# Patient Record
Sex: MALE | Race: WHITE
[De-identification: names, ages, dates, MRNs, and addresses within clinical notes are randomized per-mention and may not be internally consistent; named-entity substitution may affect disease eponyms.]

---

## 2017-05-26 ENCOUNTER — HOSPITAL ENCOUNTER (INPATIENT)
Dept: HOSPITAL 17 - PHED | Age: 57
LOS: 3 days | Discharge: HOME | DRG: 378 | End: 2017-05-29
Attending: HOSPITALIST | Admitting: HOSPITALIST
Payer: OTHER GOVERNMENT

## 2017-05-26 VITALS
TEMPERATURE: 98.4 F | DIASTOLIC BLOOD PRESSURE: 64 MMHG | HEART RATE: 73 BPM | OXYGEN SATURATION: 99 % | RESPIRATION RATE: 13 BRPM | SYSTOLIC BLOOD PRESSURE: 97 MMHG

## 2017-05-26 VITALS
TEMPERATURE: 98.8 F | DIASTOLIC BLOOD PRESSURE: 62 MMHG | SYSTOLIC BLOOD PRESSURE: 98 MMHG | OXYGEN SATURATION: 100 % | RESPIRATION RATE: 19 BRPM | HEART RATE: 79 BPM

## 2017-05-26 VITALS
TEMPERATURE: 98.2 F | OXYGEN SATURATION: 100 % | HEART RATE: 91 BPM | DIASTOLIC BLOOD PRESSURE: 75 MMHG | RESPIRATION RATE: 20 BRPM | SYSTOLIC BLOOD PRESSURE: 110 MMHG

## 2017-05-26 VITALS
TEMPERATURE: 98.7 F | DIASTOLIC BLOOD PRESSURE: 63 MMHG | SYSTOLIC BLOOD PRESSURE: 116 MMHG | RESPIRATION RATE: 20 BRPM | OXYGEN SATURATION: 98 % | HEART RATE: 109 BPM

## 2017-05-26 VITALS
DIASTOLIC BLOOD PRESSURE: 69 MMHG | SYSTOLIC BLOOD PRESSURE: 102 MMHG | HEART RATE: 82 BPM | RESPIRATION RATE: 8 BRPM | OXYGEN SATURATION: 100 %

## 2017-05-26 VITALS
OXYGEN SATURATION: 99 % | RESPIRATION RATE: 12 BRPM | DIASTOLIC BLOOD PRESSURE: 66 MMHG | HEART RATE: 80 BPM | SYSTOLIC BLOOD PRESSURE: 96 MMHG

## 2017-05-26 VITALS
OXYGEN SATURATION: 98 % | DIASTOLIC BLOOD PRESSURE: 70 MMHG | SYSTOLIC BLOOD PRESSURE: 104 MMHG | TEMPERATURE: 98.7 F | HEART RATE: 70 BPM | RESPIRATION RATE: 11 BRPM

## 2017-05-26 VITALS
RESPIRATION RATE: 11 BRPM | DIASTOLIC BLOOD PRESSURE: 75 MMHG | OXYGEN SATURATION: 100 % | HEART RATE: 90 BPM | SYSTOLIC BLOOD PRESSURE: 112 MMHG

## 2017-05-26 VITALS
RESPIRATION RATE: 10 BRPM | DIASTOLIC BLOOD PRESSURE: 65 MMHG | HEART RATE: 87 BPM | OXYGEN SATURATION: 100 % | SYSTOLIC BLOOD PRESSURE: 95 MMHG | TEMPERATURE: 98.3 F

## 2017-05-26 VITALS
HEART RATE: 86 BPM | RESPIRATION RATE: 12 BRPM | TEMPERATURE: 98.5 F | OXYGEN SATURATION: 99 % | SYSTOLIC BLOOD PRESSURE: 89 MMHG | DIASTOLIC BLOOD PRESSURE: 62 MMHG

## 2017-05-26 VITALS
HEART RATE: 82 BPM | DIASTOLIC BLOOD PRESSURE: 67 MMHG | SYSTOLIC BLOOD PRESSURE: 101 MMHG | RESPIRATION RATE: 11 BRPM | OXYGEN SATURATION: 100 %

## 2017-05-26 VITALS
TEMPERATURE: 98.4 F | SYSTOLIC BLOOD PRESSURE: 91 MMHG | RESPIRATION RATE: 7 BRPM | HEART RATE: 99 BPM | OXYGEN SATURATION: 99 % | DIASTOLIC BLOOD PRESSURE: 60 MMHG

## 2017-05-26 VITALS
DIASTOLIC BLOOD PRESSURE: 63 MMHG | OXYGEN SATURATION: 100 % | RESPIRATION RATE: 20 BRPM | HEART RATE: 102 BPM | SYSTOLIC BLOOD PRESSURE: 116 MMHG

## 2017-05-26 VITALS
RESPIRATION RATE: 9 BRPM | SYSTOLIC BLOOD PRESSURE: 85 MMHG | DIASTOLIC BLOOD PRESSURE: 56 MMHG | OXYGEN SATURATION: 100 % | HEART RATE: 76 BPM

## 2017-05-26 VITALS — OXYGEN SATURATION: 97 % | DIASTOLIC BLOOD PRESSURE: 67 MMHG | SYSTOLIC BLOOD PRESSURE: 91 MMHG | HEART RATE: 96 BPM

## 2017-05-26 VITALS
HEART RATE: 93 BPM | SYSTOLIC BLOOD PRESSURE: 100 MMHG | RESPIRATION RATE: 18 BRPM | OXYGEN SATURATION: 100 % | DIASTOLIC BLOOD PRESSURE: 69 MMHG

## 2017-05-26 VITALS — HEART RATE: 74 BPM | OXYGEN SATURATION: 98 % | RESPIRATION RATE: 13 BRPM

## 2017-05-26 VITALS — OXYGEN SATURATION: 100 %

## 2017-05-26 VITALS — OXYGEN SATURATION: 99 %

## 2017-05-26 DIAGNOSIS — D62: ICD-10-CM

## 2017-05-26 DIAGNOSIS — F17.210: ICD-10-CM

## 2017-05-26 DIAGNOSIS — E86.0: ICD-10-CM

## 2017-05-26 DIAGNOSIS — G89.29: ICD-10-CM

## 2017-05-26 DIAGNOSIS — K92.1: Primary | ICD-10-CM

## 2017-05-26 DIAGNOSIS — E87.0: ICD-10-CM

## 2017-05-26 DIAGNOSIS — K26.9: ICD-10-CM

## 2017-05-26 DIAGNOSIS — E86.1: ICD-10-CM

## 2017-05-26 DIAGNOSIS — T39.395A: ICD-10-CM

## 2017-05-26 LAB
ALP SERPL-CCNC: 57 U/L (ref 45–117)
ALT SERPL-CCNC: 44 U/L (ref 12–78)
ANION GAP SERPL CALC-SCNC: 8 MEQ/L (ref 5–15)
APTT BLD: 24.5 SEC (ref 24.3–30.1)
AST SERPL-CCNC: 32 U/L (ref 15–37)
BACTERIA #/AREA URNS HPF: (no result) /HPF
BASOPHILS # BLD AUTO: 0.1 TH/MM3 (ref 0–0.2)
BASOPHILS NFR BLD: 0.5 % (ref 0–2)
BILIRUB SERPL-MCNC: 0.2 MG/DL (ref 0.2–1)
BUN SERPL-MCNC: 22 MG/DL (ref 7–18)
CHLORIDE SERPL-SCNC: 113 MEQ/L (ref 98–107)
COLOR UR: YELLOW
COMMENT (UR): (no result)
CULTURE IF INDICATED: (no result)
EOSINOPHIL # BLD: 0.2 TH/MM3 (ref 0–0.4)
EOSINOPHIL NFR BLD: 1.4 % (ref 0–4)
ERYTHROCYTE [DISTWIDTH] IN BLOOD BY AUTOMATED COUNT: 15.2 % (ref 11.6–17.2)
GFR SERPLBLD BASED ON 1.73 SQ M-ARVRAT: 70 ML/MIN (ref 89–?)
GLUCOSE UR STRIP-MCNC: (no result) MG/DL
HCO3 BLD-SCNC: 22.1 MEQ/L (ref 21–32)
HCT VFR BLD CALC: 14.6 % (ref 39–51)
HEMO FLAGS: (no result)
HGB UR QL STRIP: (no result)
INR PPP: 1 RATIO
KETONES UR STRIP-MCNC: (no result) MG/DL
LEUKOCYTE ESTERASE UR QL STRIP: (no result) /HPF (ref 0–5)
LYMPHOCYTES # BLD AUTO: 3 TH/MM3 (ref 1–4.8)
LYMPHOCYTES NFR BLD AUTO: 19.5 % (ref 9–44)
MCH RBC QN AUTO: 29.8 PG (ref 27–34)
MCHC RBC AUTO-ENTMCNC: 33.7 % (ref 32–36)
MCV RBC AUTO: 88.4 FL (ref 80–100)
MONOCYTES NFR BLD: 6 % (ref 0–8)
NEUTROPHILS # BLD AUTO: 11 TH/MM3 (ref 1.8–7.7)
NEUTROPHILS NFR BLD AUTO: 72.6 % (ref 16–70)
NITRITE UR QL STRIP: (no result)
PLAT MORPH BLD: NORMAL
PLATELET # BLD: 351 TH/MM3 (ref 150–450)
PLATELET BLD QL SMEAR: NORMAL
POTASSIUM SERPL-SCNC: 3.5 MEQ/L (ref 3.5–5.1)
PROTHROMBIN TIME: 10.8 SEC (ref 9.8–11.6)
RBC # BLD AUTO: 1.65 MIL/MM3 (ref 4.5–5.9)
RBC #/AREA URNS HPF: (no result) /HPF (ref 0–3)
SCAN/DIFF: (no result)
SODIUM SERPL-SCNC: 143 MEQ/L (ref 136–145)
SP GR UR STRIP: 1.02 (ref 1–1.03)
SQUAMOUS #/AREA URNS HPF: (no result) /HPF (ref 0–5)
WBC # BLD AUTO: 15.2 TH/MM3 (ref 4–11)

## 2017-05-26 PROCEDURE — 88312 SPECIAL STAINS GROUP 1: CPT

## 2017-05-26 PROCEDURE — 36430 TRANSFUSION BLD/BLD COMPNT: CPT

## 2017-05-26 PROCEDURE — 96374 THER/PROPH/DIAG INJ IV PUSH: CPT

## 2017-05-26 PROCEDURE — 96376 TX/PRO/DX INJ SAME DRUG ADON: CPT

## 2017-05-26 PROCEDURE — 85027 COMPLETE CBC AUTOMATED: CPT

## 2017-05-26 PROCEDURE — 86920 COMPATIBILITY TEST SPIN: CPT

## 2017-05-26 PROCEDURE — 88305 TISSUE EXAM BY PATHOLOGIST: CPT

## 2017-05-26 PROCEDURE — 85610 PROTHROMBIN TIME: CPT

## 2017-05-26 PROCEDURE — 81001 URINALYSIS AUTO W/SCOPE: CPT

## 2017-05-26 PROCEDURE — 86900 BLOOD TYPING SEROLOGIC ABO: CPT

## 2017-05-26 PROCEDURE — 86901 BLOOD TYPING SEROLOGIC RH(D): CPT

## 2017-05-26 PROCEDURE — 85014 HEMATOCRIT: CPT

## 2017-05-26 PROCEDURE — 80048 BASIC METABOLIC PNL TOTAL CA: CPT

## 2017-05-26 PROCEDURE — 85025 COMPLETE CBC W/AUTO DIFF WBC: CPT

## 2017-05-26 PROCEDURE — 80053 COMPREHEN METABOLIC PANEL: CPT

## 2017-05-26 PROCEDURE — P9016 RBC LEUKOCYTES REDUCED: HCPCS

## 2017-05-26 PROCEDURE — C9113 INJ PANTOPRAZOLE SODIUM, VIA: HCPCS

## 2017-05-26 PROCEDURE — 85730 THROMBOPLASTIN TIME PARTIAL: CPT

## 2017-05-26 PROCEDURE — 30233N1 TRANSFUSION OF NONAUTOLOGOUS RED BLOOD CELLS INTO PERIPHERAL VEIN, PERCUTANEOUS APPROACH: ICD-10-PCS | Performed by: EMERGENCY MEDICINE

## 2017-05-26 PROCEDURE — 87641 MR-STAPH DNA AMP PROBE: CPT

## 2017-05-26 PROCEDURE — 85018 HEMOGLOBIN: CPT

## 2017-05-26 PROCEDURE — 86850 RBC ANTIBODY SCREEN: CPT

## 2017-05-26 PROCEDURE — 83735 ASSAY OF MAGNESIUM: CPT

## 2017-05-26 PROCEDURE — 93005 ELECTROCARDIOGRAM TRACING: CPT

## 2017-05-26 PROCEDURE — 71010: CPT

## 2017-05-26 RX ADMIN — PANTOPRAZOLE SODIUM SCH MLS/HR: 40 INJECTION, POWDER, FOR SOLUTION INTRAVENOUS at 15:29

## 2017-05-26 RX ADMIN — MAGNESIUM SULFATE HEPTAHYDRATE SCH MLS/HR: 500 INJECTION, SOLUTION INTRAMUSCULAR; INTRAVENOUS at 16:15

## 2017-05-26 RX ADMIN — Medication SCH ML: at 20:22

## 2017-05-26 NOTE — PD
HPI


.


Dizziness


Chief Complaint:  Dizziness


Time Seen by Provider:  14:38


Travel History


International Travel<30 days:  No


Contact w/Intl Traveler<30days:  No


Traveled to known affect area:  No





History of Present Illness


HPI


Patient presents with about a one-week history of dizziness.  Dizziness is 

exacerbated by standing and improved by laying down.  He denies any associated 

chest pain or shortness of breath.  He does admit to dark stools for the last 

week.  He also admits to taking large doses of ibuprofen for back pain.  He 

reports minimal epigastric discomfort.





[facility]


Social History


Alcohol Use:  No


Tobacco Use:  Yes (1 1/2)


Substance Use:  No





Allergies-Medications


(Allergen,Severity, Reaction):  


Coded Allergies:  


     Penicillin (Verified  Allergy, Severe, HIVES, 5/26/17)


Reported Meds & Prescriptions





Reported Meds & Active Scripts


Active


No Active Prescriptions or Reported Medications    








Review of Systems


Except as stated in HPI:  all other systems reviewed are Neg


General / Constitutional:  No: Fever, Chills


Cardiovascular:  No: Chest Pain or Discomfort


Respiratory:  No: Shortness of Breath


Gastrointestinal:  Positive: Abdominal Pain, Other (dark stools),  No: Nausea, 

Vomiting, Diarrhea


Musculoskeletal:  Positive: Pain (chronic back pain)





Physical Exam


Narrative


GENERAL: Awake and alert.


SKIN: Pale and dry.


HEAD: Atraumatic. Normocephalic. 


EYES: Pupils equal and round.  Conjunctiva pale.


ENT: No nasal bleeding or discharge.  Mucous membranes pink and moist.


NECK: Trachea midline.  Neck supple.


CARDIOVASCULAR: Regular rate and rhythm.  


RESPIRATORY: No accessory muscle use. 


GASTROINTESTINAL: Abdomen soft, non-tender, nondistended. 


RECTAL: Black stool.  No masses.


MUSCULOSKELETAL: No obvious deformities.   No edema. 


NEUROLOGICAL: Awake and alert. No obvious cranial nerve deficits.  Motor 

grossly within normal limits. Normal speech.


PSYCHIATRIC: Appropriate mood and affect; insight and judgment normal.





Data


Data


Last Documented VS





Vital Signs








  Date Time  Temp Pulse Resp B/P Pulse Ox O2 Delivery O2 Flow Rate FiO2


 


5/26/17 15:32  96  91/67 97 Room Air  


 


5/26/17 14:35   20     


 


5/26/17 14:27 98.7       








Orders





 Comprehensive Metabolic Panel (5/26/17 14:38)


Prothrombin Time / Inr (Pt) (5/26/17 14:38)


Act Partial Throm Time (Ptt) (5/26/17 14:38)


Type And Screen (5/26/17 14:38)


Red Blood Cells (Rbc) (5/26/17 14:38)


Ecg Monitoring (5/26/17 14:38)


Iv Access Insert/Monitor (5/26/17 14:38)


Oximetry (5/26/17 14:38)


Oxygen Administration (5/26/17 14:38)


Ondansetron Inj (Zofran Inj) (5/26/17 14:45)


Sodium Chlor 0.9% 1000 Ml Inj (Ns 1000 M (5/26/17 14:38)


Sodium Chloride 0.9% Flush (Ns Flush) (5/26/17 14:45)


Pantoprazole Inj (Protonix Inj) (5/26/17 14:45)


Pantoprazole Inj (Protonix Inj) (5/26/17 14:45)


Complete Blood Count With Diff (5/26/17 14:44)


Red Blood Cells (Rbc) (5/26/17 15:13)


Blood Product Administration .UPON TRANSFUSION (5/26/17 15:13)


Sodium Chlor 0.9% 250 Ml Inj (Ns 250 Ml (5/26/17 15:15)


Diphenhydramine (Benadryl) (5/26/17 15:15)


Acetaminophen (Tylenol) (5/26/17 15:15)


Consult Gastroenterology (5/26/17 )


Admit Order (Ed Use Only) (5/26/17 15:37)


Electrocardiogram (5/26/17 14:22)





Labs








 Laboratory Tests








Test 5/26/17 5/26/17





 14:42 14:50


 


White Blood Count 15.2 TH/MM3 


 


Red Blood Count 1.65 MIL/MM3 


 


Hemoglobin 4.9 GM/DL 


 


Hematocrit 14.6 % 


 


Mean Corpuscular Volume 88.4 FL 


 


Mean Corpuscular Hemoglobin 29.8 PG 


 


Mean Corpuscular Hemoglobin 33.7 % 





Concent  


 


Red Cell Distribution Width 15.2 % 


 


Platelet Count 351 TH/MM3 


 


Mean Platelet Volume 7.4 FL 


 


Neutrophils (%) (Auto) 72.6 % 


 


Lymphocytes (%) (Auto) 19.5 % 


 


Monocytes (%) (Auto) 6.0 % 


 


Eosinophils (%) (Auto) 1.4 % 


 


Basophils (%) (Auto) 0.5 % 


 


Neutrophils # (Auto) 11.0 TH/MM3 


 


Lymphocytes # (Auto) 3.0 TH/MM3 


 


Monocytes # (Auto) 0.9 TH/MM3 


 


Eosinophils # (Auto) 0.2 TH/MM3 


 


Basophils # (Auto) 0.1 TH/MM3 


 


CBC Comment AUTO DIFF  


 


Differential Comment AUTO DIFF 





 CONFIRMED 


 


Platelet Estimate NORMAL  


 


Platelet Morphology Comment NORMAL  


 


Prothrombin Time 10.8 SEC 


 


Prothromb Time International 1.0 RATIO 





Ratio  


 


Activated Partial 24.5 SEC 





Thromboplast Time  


 


Sodium Level 143 MEQ/L 


 


Potassium Level 3.5 MEQ/L 


 


Chloride Level 113 MEQ/L 


 


Carbon Dioxide Level 22.1 MEQ/L 


 


Anion Gap 8 MEQ/L 


 


Blood Urea Nitrogen 22 MG/DL 


 


Creatinine 1.10 MG/DL 


 


Estimat Glomerular Filtration 70 ML/MIN 





Rate  


 


Random Glucose 100 MG/DL 


 


Calcium Level 9.0 MG/DL 


 


Total Bilirubin 0.2 MG/DL 


 


Aspartate Amino Transf 32 U/L 





(AST/SGOT)  


 


Alanine Aminotransferase 44 U/L 





(ALT/SGPT)  


 


Alkaline Phosphatase 57 U/L 


 


Total Protein 5.5 GM/DL 


 


Albumin 2.5 GM/DL 


 


Crossmatch Leukocyte-Reduced 





 Red Blood 





 Cells 


 


Blood Bank Comment   


 


Blood Type O POSITIVE  O POSITIVE 


 


Antibody Screen NEGATIVE  














MDM


Medical Decision Making


Medical Screen Exam Complete:  Yes


Emergency Medical Condition:  Yes


Interpretation(s)


EKG shows a sinus rhythm with a rate of 100.  No acute ST segment elevation or 

depression.


Differential Diagnosis


Differential diagnosis of dizziness includes but is not limited to vertigo, 

dehydration, acute blood loss, sepsis, ACS


Narrative Course


Patient presents with dizziness.  He is very pale.  He has black stools.  I 

have ordered a Protonix bolus and drip.  I will have the blood bank go ahead 

and type and cross him for 2 units.





CBC & BMP Diagram


5/26/17 14:42











I ordered 2 more units of PRBCs to be transfused for a total of 4 for 

transfusion.  I ordered another 2 to be on standby.


Critical Care Narrative


Aggregate critical care time was 45 minutes. Time to perform other separately 

billable procedures was not  


included in the critical care time. My time did not include minutes spent 

treating any other patients simultaneously or on  


activities that did not directly contribute to the patient's treatment.  





The services I provided to this patient were to treat and/or prevent clinically 

significant deterioration due to GI bleed with profound anemia





I provided critical care services requiring my management, as noted below:


Chart data review, documentation time, medication orders and management, vital 

sign assessments/reviewing monitor data,  


ordering and reviewing lab tests, ordering and interpreting/reviewing x-rays 

and diagnostic studies, care of the patient  


and discussion of the patient with the admitting physicians





HemaPrompt Point of Care


Internal Pos. & Neg. Controls:  Passed


Fecal Specimen Occult Blood:  Positive





Physician Communication


Physician Communication


Dr. Harper admitted the patient to the hospital with Dr. Smith from GI 

consulting.





Diagnosis





 Primary Impression:  


 Dizziness


 Additional Impression:  


 GI bleed due to NSAIDs





Admitting Information


Admitting Physician Requests:  Admit


Scripts


No Active Prescriptions or Reported Meds


Condition:  Stable








Lawanda Almaraz MD May 26, 2017 14:44

## 2017-05-26 NOTE — HHI.HP
__________________________________________________





[facility]


Service


[facility]


Primary Care Physician


[facility]


Admission Diagnosis


ANEMIA, GI BLEED


Diagnoses:  


Chief Complaint:  


Weakness


Travel History


International Travel<30 Days:  No


Contact w/Intl Traveler <30 Da:  No


Traveled to Known Affected Are:  No


History of Present Illness


The patient is a 56-year-old male who is presenting to the hospital with 

weakness, lethargy and abdominal pain.  The patient says that a couple of 

months ago he hurt his back while lifting something heavy and since then he has 

been taking up to 16 ibuprofen tablets daily.  The patient has been 

experiencing pain underneath both of his ribs for the past week.  Also over the 

past week he has noticed his stools appearing dark in color and more loose in 

consistency.  He has been expressing bouts of dizziness starting last night, 

and he also had 2 bouts of dizziness this morning.  He says he has no energy.  

He says he started to look pale starting today.  He denies any previous history 

of GI bleeding.  The patient mentions that his back pain is still present.  He 

also describes a numb sensation on the bottom of his right foot that has been 

there for a long time.  He says over the past few weeks that sensation has 

started to creep up to his right knee.  He denies the back pain radiating down 

his legs.  The patient has not had much of an appetite today.





Review of Systems


Except as stated in HPI:  all other systems reviewed are Neg





Past Family Social History


Past Medical History


The patient denies medical history


Past Surgical History


The patient denies surgery


Allergies:  


Coded Allergies:  


     Penicillin (Verified  Allergy, Severe, HIVES, 5/26/17)


Active Ordered Medications





 Current Medications








 Medications


  (Trade)  Dose


 Ordered  Sig/Calvin


 Route  Start Time


 Stop Time Status Last Admin


 


 Pantoprazole


 Sodium 80 mg/


 Sodium Chloride  100 ml @ 


 10 mls/hr  Q10H


 IV  5/26/17 14:45


    5/26/17 15:29


 


 


 Sodium Chloride  250 ml @ 


 15 mls/hr  ONCE  ONCE


 IV  5/26/17 15:15


 5/27/17 07:54   


 


 


  (D5W-1/2 NS 1000


 ml Inj)  1,000 ml @ 


 125 mls/hr  Q8H


 IV  5/26/17 16:15


     


 


 


  (NS Flush)  2 ml  UNSCH  PRN


 IV FLUSH  5/26/17 16:15


     


 


 


  (NS Flush)  2 ml  BID


 IV FLUSH  5/26/17 21:00


     


 


 


  (Tylenol)  650 mg  Q4H  PRN


 PO  5/26/17 16:15


     


 


 


  (Zofran Inj)  4 mg  Q6H  PRN


 IVP  5/26/17 16:15


     


 


 


  (Tylenol)  650 mg  Q6H  PRN


 PO  5/26/17 16:15


     


 


 


  (Morphine Inj)  2 mg  Q3H  PRN


 IV  5/26/17 16:15


     


 


 


  (Roxicodone)  5 mg  Q4H  PRN


 PO  5/26/17 16:15


     


 


 


  (Narcan Inj)  0.4 mg  UNSCH  PRN


 IV  5/26/17 16:15


     


 








Family History


CAD


Hypertension


COPD


Social History


The patient smokes 1 pack per day.  He quit drinking 2-1/2 years ago.





Physical Exam


Vital Signs





 Vital Signs








  Date Time  Temp Pulse Resp B/P Pulse Ox O2 Delivery O2 Flow Rate FiO2


 


5/26/17 16:16  93 18 100/69 100 Room Air  


 


5/26/17 15:32  96  91/67 97 Room Air  


 


5/26/17 14:49     99 Room Air  


 


5/26/17 14:49     99 Room Air  


 


5/26/17 14:35  102 20 116/63 100 Room Air  


 


5/26/17 14:27 98.7 109 20 116/63 98   








Physical Exam


GENERAL: This is a well-nourished, well-developed patient, in no apparent 

distress.


SKIN: No rashes, ecchymoses or lesions. Cool and dry.  Pale.


HEAD: Atraumatic. Normocephalic. No temporal or scalp tenderness.


EYES: Pupils equal round and reactive. Extraocular motions intact. No scleral 

icterus. No injection or drainage.  Conjunctival pallor.


ENT: Nose without bleeding, purulent drainage or septal hematoma. Throat 

without erythema, tonsillar hypertrophy or exudate. Uvula midline. Airway 

patent.


NECK: Trachea midline. No JVD or lymphadenopathy. Supple, nontender, no 

meningeal signs.


CARDIOVASCULAR: Tachycardic, grade 1 systolic murmur appreciated. 


RESPIRATORY: Clear to auscultation. Breath sounds equal bilaterally. No wheezes

, rales, or rhonchi.  


GASTROINTESTINAL: Abdomen soft, non-tender, nondistended. No hepato-splenomegaly

, or palpable masses. No guarding.


MUSCULOSKELETAL: Extremities without clubbing, cyanosis, or edema. No joint 

tenderness, effusion, or edema noted. 


NEUROLOGICAL: Awake and alert. Cranial nerves II through XII intact.  Motor and 

sensory grossly within normal limits. Five out of 5 muscle strength in all 

muscle groups.  Normal speech.


PSYCH: Mood and affect appropriate.


Laboratory





Laboratory Tests








Test 5/26/17 5/26/17





 14:42 14:50


 


White Blood Count 15.2  


 


Red Blood Count 1.65  


 


Hemoglobin 4.9  


 


Hematocrit 14.6  


 


Mean Corpuscular Volume 88.4  


 


Mean Corpuscular Hemoglobin 29.8  


 


Mean Corpuscular Hemoglobin 33.7  





Concent  


 


Red Cell Distribution Width 15.2  


 


Platelet Count 351  


 


Mean Platelet Volume 7.4  


 


Neutrophils (%) (Auto) 72.6  


 


Lymphocytes (%) (Auto) 19.5  


 


Monocytes (%) (Auto) 6.0  


 


Eosinophils (%) (Auto) 1.4  


 


Basophils (%) (Auto) 0.5  


 


Neutrophils # (Auto) 11.0  


 


Lymphocytes # (Auto) 3.0  


 


Monocytes # (Auto) 0.9  


 


Eosinophils # (Auto) 0.2  


 


Basophils # (Auto) 0.1  


 


CBC Comment AUTO DIFF  


 


Prothrombin Time 10.8  


 


Prothromb Time International 1.0  





Ratio  


 


Activated Partial 24.5  





Thromboplast Time  


 


Sodium Level 143  


 


Potassium Level 3.5  


 


Chloride Level 113  


 


Carbon Dioxide Level 22.1  


 


Anion Gap 8  


 


Blood Urea Nitrogen 22  


 


Creatinine 1.10  


 


Estimat Glomerular Filtration 70  





Rate  


 


Random Glucose 100  


 


Calcium Level 9.0  


 


Total Bilirubin 0.2  


 


Aspartate Amino Transf 32  





(AST/SGOT)  


 


Alanine Aminotransferase 44  





(ALT/SGPT)  


 


Alkaline Phosphatase 57  


 


Total Protein 5.5  


 


Albumin 2.5  


 


Blood Type O POSITIVE  O POSITIVE 








Result Diagram:  


5/26/17 1442                                                                   

             5/26/17 1442








Assessment and Plan


Assessment and Plan


Acute blood loss anemia


The patient presents with weakness, lethargy and appears pale.  He has been 

taking up to 16 ibuprofen daily for quite some time.  Hemoglobin found to be 

4.9.  The patient likely has an upper GI bleed.


- Red blood cells ordered and transfusion is pending per emergency department 

physician.


- Continue IV Protonix drip.


- Follow CBC every 6 hours.


- IV fluids.


- Gastroenterology consult requested.


- Monitor in the ICU.


- advised to avoid NSAIDs.





Hypotension


Mild.  Secondary to hypovolemia from GI bleed.


- IV fluids.


- 2 units packed red cells pending.





Leukocytosis


The patient is afebrile.  Likely a stress reaction.


- Chest x-ray and UA pending to rule out infection.





Nicotine dependence


The patient smokes one pack daily.


- Cessation instruction given.





PPx: SCDs


Code Status


Full


Discussed Condition With


Pt, Dr. Almaraz, nurse





Physician Certification


2 Midnight Certification Type:  Admission for Inpatient Services


Order for Inpatient Services


The services are ordered in accordance with Medicare regulations or non-

Medicare payer requirements, as applicable.  In the case of services not 

specified as inpatient-only, they are appropriately provided as inpatient 

services in accordance with the 2-midnight benchmark.


Estimated LOS (days):  2


 days is the estimated time the patient will need to remain in the hospital, 

assuming treatment plan goals are met and no additional complications.


Post-Hospital Plan:  Home








Chuck Harper DO May 26, 2017 16:18

## 2017-05-26 NOTE — RADHPO
EXAM DATE/TIME:  05/26/2017 16:42 

 

[facility] COMPARISON:     

No previous studies available for comparison.

 

                     

INDICATIONS :     

Shortness of breath. 

                     

 

MEDICAL HISTORY :     

None.          

 

SURGICAL HISTORY :     

None.   

 

ENCOUNTER:     

Initial                                        

 

ACUITY:     

1 day      

 

PAIN SCORE:     

0/10

 

LOCATION:     

Bilateral chest 

 

FINDINGS:     

A single view of the chest demonstrates the lungs to be symmetrically aerated without evidence of mas
s, infiltrate or effusion.  The cardiomediastinal contours are unremarkable.  Osseous structures are 
intact.

 

CONCLUSION:     No acute disease.  

 

 

 

 Chong Man MD on May 26, 2017 at 17:04           

Board Certified Radiologist.

 This report was verified electronically.

## 2017-05-27 VITALS
OXYGEN SATURATION: 100 % | SYSTOLIC BLOOD PRESSURE: 100 MMHG | HEART RATE: 64 BPM | RESPIRATION RATE: 15 BRPM | DIASTOLIC BLOOD PRESSURE: 69 MMHG

## 2017-05-27 VITALS
HEART RATE: 64 BPM | TEMPERATURE: 98.6 F | DIASTOLIC BLOOD PRESSURE: 66 MMHG | RESPIRATION RATE: 14 BRPM | SYSTOLIC BLOOD PRESSURE: 110 MMHG | OXYGEN SATURATION: 100 %

## 2017-05-27 VITALS — OXYGEN SATURATION: 100 %

## 2017-05-27 VITALS
DIASTOLIC BLOOD PRESSURE: 70 MMHG | TEMPERATURE: 98.4 F | HEART RATE: 82 BPM | RESPIRATION RATE: 9 BRPM | OXYGEN SATURATION: 99 % | SYSTOLIC BLOOD PRESSURE: 109 MMHG

## 2017-05-27 VITALS — RESPIRATION RATE: 14 BRPM | HEART RATE: 74 BPM | SYSTOLIC BLOOD PRESSURE: 123 MMHG | DIASTOLIC BLOOD PRESSURE: 63 MMHG

## 2017-05-27 VITALS
OXYGEN SATURATION: 100 % | RESPIRATION RATE: 12 BRPM | DIASTOLIC BLOOD PRESSURE: 57 MMHG | HEART RATE: 76 BPM | SYSTOLIC BLOOD PRESSURE: 97 MMHG | TEMPERATURE: 98 F

## 2017-05-27 VITALS
HEART RATE: 66 BPM | DIASTOLIC BLOOD PRESSURE: 66 MMHG | SYSTOLIC BLOOD PRESSURE: 115 MMHG | RESPIRATION RATE: 14 BRPM | OXYGEN SATURATION: 100 %

## 2017-05-27 VITALS
OXYGEN SATURATION: 100 % | DIASTOLIC BLOOD PRESSURE: 66 MMHG | RESPIRATION RATE: 13 BRPM | HEART RATE: 66 BPM | SYSTOLIC BLOOD PRESSURE: 102 MMHG

## 2017-05-27 VITALS
HEART RATE: 80 BPM | TEMPERATURE: 98.1 F | RESPIRATION RATE: 13 BRPM | DIASTOLIC BLOOD PRESSURE: 90 MMHG | SYSTOLIC BLOOD PRESSURE: 133 MMHG

## 2017-05-27 VITALS
SYSTOLIC BLOOD PRESSURE: 108 MMHG | HEART RATE: 70 BPM | OXYGEN SATURATION: 100 % | DIASTOLIC BLOOD PRESSURE: 66 MMHG | RESPIRATION RATE: 13 BRPM

## 2017-05-27 VITALS
OXYGEN SATURATION: 100 % | HEART RATE: 78 BPM | DIASTOLIC BLOOD PRESSURE: 54 MMHG | SYSTOLIC BLOOD PRESSURE: 89 MMHG | RESPIRATION RATE: 9 BRPM

## 2017-05-27 VITALS
RESPIRATION RATE: 14 BRPM | DIASTOLIC BLOOD PRESSURE: 68 MMHG | OXYGEN SATURATION: 100 % | TEMPERATURE: 98.2 F | HEART RATE: 78 BPM | SYSTOLIC BLOOD PRESSURE: 102 MMHG

## 2017-05-27 VITALS
OXYGEN SATURATION: 100 % | TEMPERATURE: 98 F | SYSTOLIC BLOOD PRESSURE: 96 MMHG | DIASTOLIC BLOOD PRESSURE: 71 MMHG | HEART RATE: 86 BPM | RESPIRATION RATE: 15 BRPM

## 2017-05-27 VITALS
OXYGEN SATURATION: 99 % | DIASTOLIC BLOOD PRESSURE: 66 MMHG | RESPIRATION RATE: 14 BRPM | HEART RATE: 66 BPM | SYSTOLIC BLOOD PRESSURE: 92 MMHG

## 2017-05-27 VITALS
SYSTOLIC BLOOD PRESSURE: 97 MMHG | HEART RATE: 78 BPM | DIASTOLIC BLOOD PRESSURE: 63 MMHG | RESPIRATION RATE: 10 BRPM | TEMPERATURE: 98.5 F | OXYGEN SATURATION: 99 %

## 2017-05-27 VITALS
TEMPERATURE: 98 F | SYSTOLIC BLOOD PRESSURE: 97 MMHG | OXYGEN SATURATION: 99 % | DIASTOLIC BLOOD PRESSURE: 63 MMHG | HEART RATE: 87 BPM | RESPIRATION RATE: 10 BRPM

## 2017-05-27 VITALS
OXYGEN SATURATION: 100 % | DIASTOLIC BLOOD PRESSURE: 69 MMHG | HEART RATE: 78 BPM | RESPIRATION RATE: 15 BRPM | SYSTOLIC BLOOD PRESSURE: 98 MMHG

## 2017-05-27 VITALS — HEART RATE: 82 BPM | OXYGEN SATURATION: 100 % | RESPIRATION RATE: 15 BRPM

## 2017-05-27 VITALS — SYSTOLIC BLOOD PRESSURE: 126 MMHG | RESPIRATION RATE: 18 BRPM | HEART RATE: 84 BPM | DIASTOLIC BLOOD PRESSURE: 83 MMHG

## 2017-05-27 VITALS
HEART RATE: 66 BPM | RESPIRATION RATE: 14 BRPM | OXYGEN SATURATION: 99 % | DIASTOLIC BLOOD PRESSURE: 66 MMHG | SYSTOLIC BLOOD PRESSURE: 92 MMHG | TEMPERATURE: 98.4 F

## 2017-05-27 VITALS — OXYGEN SATURATION: 100 % | HEART RATE: 82 BPM | RESPIRATION RATE: 18 BRPM

## 2017-05-27 VITALS — OXYGEN SATURATION: 100 % | HEART RATE: 80 BPM | RESPIRATION RATE: 14 BRPM

## 2017-05-27 VITALS
HEART RATE: 68 BPM | SYSTOLIC BLOOD PRESSURE: 99 MMHG | OXYGEN SATURATION: 99 % | DIASTOLIC BLOOD PRESSURE: 66 MMHG | RESPIRATION RATE: 13 BRPM

## 2017-05-27 VITALS — DIASTOLIC BLOOD PRESSURE: 81 MMHG | RESPIRATION RATE: 15 BRPM | HEART RATE: 82 BPM | SYSTOLIC BLOOD PRESSURE: 128 MMHG

## 2017-05-27 VITALS — HEART RATE: 82 BPM | RESPIRATION RATE: 14 BRPM | OXYGEN SATURATION: 100 %

## 2017-05-27 VITALS
HEART RATE: 82 BPM | OXYGEN SATURATION: 100 % | RESPIRATION RATE: 9 BRPM | SYSTOLIC BLOOD PRESSURE: 82 MMHG | DIASTOLIC BLOOD PRESSURE: 54 MMHG

## 2017-05-27 VITALS
RESPIRATION RATE: 14 BRPM | SYSTOLIC BLOOD PRESSURE: 109 MMHG | HEART RATE: 78 BPM | DIASTOLIC BLOOD PRESSURE: 67 MMHG | OXYGEN SATURATION: 100 %

## 2017-05-27 VITALS — DIASTOLIC BLOOD PRESSURE: 79 MMHG | SYSTOLIC BLOOD PRESSURE: 118 MMHG

## 2017-05-27 VITALS
SYSTOLIC BLOOD PRESSURE: 112 MMHG | OXYGEN SATURATION: 99 % | DIASTOLIC BLOOD PRESSURE: 77 MMHG | RESPIRATION RATE: 15 BRPM | HEART RATE: 72 BPM

## 2017-05-27 VITALS
DIASTOLIC BLOOD PRESSURE: 55 MMHG | OXYGEN SATURATION: 100 % | SYSTOLIC BLOOD PRESSURE: 98 MMHG | RESPIRATION RATE: 14 BRPM | HEART RATE: 70 BPM

## 2017-05-27 VITALS — HEART RATE: 68 BPM

## 2017-05-27 VITALS
HEART RATE: 68 BPM | SYSTOLIC BLOOD PRESSURE: 100 MMHG | DIASTOLIC BLOOD PRESSURE: 68 MMHG | RESPIRATION RATE: 12 BRPM | OXYGEN SATURATION: 99 %

## 2017-05-27 LAB
ALP SERPL-CCNC: 57 U/L (ref 45–117)
ALT SERPL-CCNC: 36 U/L (ref 12–78)
ANION GAP SERPL CALC-SCNC: 5 MEQ/L (ref 5–15)
ANION GAP SERPL CALC-SCNC: 6 MEQ/L (ref 5–15)
AST SERPL-CCNC: 26 U/L (ref 15–37)
BASOPHILS # BLD AUTO: 0.1 TH/MM3 (ref 0–0.2)
BASOPHILS NFR BLD: 0.5 % (ref 0–2)
BILIRUB SERPL-MCNC: 0.3 MG/DL (ref 0.2–1)
BUN SERPL-MCNC: 10 MG/DL (ref 7–18)
BUN SERPL-MCNC: 15 MG/DL (ref 7–18)
CHLORIDE SERPL-SCNC: 117 MEQ/L (ref 98–107)
CHLORIDE SERPL-SCNC: 118 MEQ/L (ref 98–107)
EOSINOPHIL # BLD: 0.2 TH/MM3 (ref 0–0.4)
EOSINOPHIL NFR BLD: 1.9 % (ref 0–4)
ERYTHROCYTE [DISTWIDTH] IN BLOOD BY AUTOMATED COUNT: 15.2 % (ref 11.6–17.2)
GFR SERPLBLD BASED ON 1.73 SQ M-ARVRAT: 70 ML/MIN (ref 89–?)
GFR SERPLBLD BASED ON 1.73 SQ M-ARVRAT: 70 ML/MIN (ref 89–?)
HCO3 BLD-SCNC: 22.4 MEQ/L (ref 21–32)
HCO3 BLD-SCNC: 24.6 MEQ/L (ref 21–32)
HCT VFR BLD CALC: 22.1 % (ref 39–51)
HCT VFR BLD CALC: 24.2 % (ref 39–51)
HCT VFR BLD CALC: 25.3 % (ref 39–51)
HCT VFR BLD CALC: 25.5 % (ref 39–51)
HEMO FLAGS: (no result)
LYMPHOCYTES # BLD AUTO: 2.3 TH/MM3 (ref 1–4.8)
LYMPHOCYTES NFR BLD AUTO: 21.2 % (ref 9–44)
MCH RBC QN AUTO: 29 PG (ref 27–34)
MCHC RBC AUTO-ENTMCNC: 32.7 % (ref 32–36)
MCV RBC AUTO: 88.7 FL (ref 80–100)
MONOCYTES NFR BLD: 5.9 % (ref 0–8)
NEUTROPHILS # BLD AUTO: 7.7 TH/MM3 (ref 1.8–7.7)
NEUTROPHILS NFR BLD AUTO: 70.5 % (ref 16–70)
PLATELET # BLD: 232 TH/MM3 (ref 150–450)
POTASSIUM SERPL-SCNC: 3.9 MEQ/L (ref 3.5–5.1)
POTASSIUM SERPL-SCNC: 4.1 MEQ/L (ref 3.5–5.1)
RBC # BLD AUTO: 2.85 MIL/MM3 (ref 4.5–5.9)
REVIEW FLAG: (no result)
SODIUM SERPL-SCNC: 146 MEQ/L (ref 136–145)
SODIUM SERPL-SCNC: 147 MEQ/L (ref 136–145)
WBC # BLD AUTO: 10.9 TH/MM3 (ref 4–11)

## 2017-05-27 RX ADMIN — PANTOPRAZOLE SODIUM SCH MLS/HR: 40 INJECTION, POWDER, FOR SOLUTION INTRAVENOUS at 21:23

## 2017-05-27 RX ADMIN — Medication SCH ML: at 21:23

## 2017-05-27 RX ADMIN — PANTOPRAZOLE SODIUM SCH MLS/HR: 40 INJECTION, POWDER, FOR SOLUTION INTRAVENOUS at 11:55

## 2017-05-27 RX ADMIN — MAGNESIUM SULFATE HEPTAHYDRATE SCH MLS/HR: 500 INJECTION, SOLUTION INTRAMUSCULAR; INTRAVENOUS at 03:20

## 2017-05-27 RX ADMIN — Medication SCH ML: at 11:58

## 2017-05-27 RX ADMIN — MAGNESIUM SULFATE HEPTAHYDRATE SCH MLS/HR: 500 INJECTION, SOLUTION INTRAMUSCULAR; INTRAVENOUS at 16:45

## 2017-05-27 RX ADMIN — PANTOPRAZOLE SODIUM SCH MLS/HR: 40 INJECTION, POWDER, FOR SOLUTION INTRAVENOUS at 20:47

## 2017-05-27 RX ADMIN — MAGNESIUM SULFATE HEPTAHYDRATE SCH MLS/HR: 500 INJECTION, SOLUTION INTRAMUSCULAR; INTRAVENOUS at 21:22

## 2017-05-27 RX ADMIN — CHLORHEXIDINE GLUCONATE SCH PACK: 500 CLOTH TOPICAL at 01:00

## 2017-05-27 RX ADMIN — MAGNESIUM SULFATE HEPTAHYDRATE SCH MLS/HR: 500 INJECTION, SOLUTION INTRAMUSCULAR; INTRAVENOUS at 11:37

## 2017-05-27 RX ADMIN — PANTOPRAZOLE SODIUM SCH MLS/HR: 40 INJECTION, POWDER, FOR SOLUTION INTRAVENOUS at 01:00

## 2017-05-27 NOTE — MB
cc:

DANIELA CARRERA MD

****

 

 

DATE OF CONSULTATION:  05/27/2017.

 

REASON FOR CONSULTATION:

GI bleeding.

 

REFERRING PHYSICIAN:

Dr. Chuck Harper.

 

CONSULTING PHYSICIAN:

BRIANNA Carrera MD.

 

HISTORY OF PRESENT ILLNESS:

A 56-year-old male patient otherwise healthy presented to the hospital

complaining of weakness, fatigue, vague abdominal discomfort and dizziness.

The patient was on nonsteroidal anti-inflammatory drugs for increased back pain

after he hurt his back lifting heavy objects. Since that time the patient has

been taking sixteen ibuprofen tablets daily.

 

The patient noticed dark stools over the last few days that appeared to be more

frequent than normal but no fresh blood.  The patient is denying any abdominal

pain, although has left upper quadrant discomfort.  No nausea or vomiting.  No

hematemesis.  He also noticed dizziness, especially when standing up just a day

before presenting to the hospital.

 

At hospitalization, the patient was seen by his primary physician in the

emergency room and a blood test showed a hemoglobin of 4.97 and hematocrit of

14.6.  He was found to be tender in the left upper quadrant. His indices are

suggestive of chronic changes with normal MCV and MCH. Otherwise his workup was

essentially unremarkable.

 

The patient received a blood transfusion and his hemoglobin went up to 8.2 and

hematocrit 24.7.

 

REVIEW OF SYSTEMS

All fourteen elements in the review of systems were negative other than the

ones mentioned in the history of present illness.

 

 

 

 

 

FAMILY HISTORY:

Positive for colon polyp in his father who is undergoing surveillance and

surveillance colonoscopies.

 

PAST MEDICAL HISTORY:

Hypertension.

 

PAST SURGICAL HISTORY:

Negative.

 

MEDICATIONS AT HOME:

Ibuprofen sixteen (16) tablets daily for chronic back pain.

 

ALLERGIES:

PENICILLIN.

 

SOCIAL HISTORY:

The patient smokes one pack per day.  He quit two-and-a-half years ago.

 

PHYSICAL EXAMINATION:

GENERAL:  On examination, the patient was found to be comfortable not in

distress or in pain, hemodynamically stable.

VITAL SIGNS: Temperature of 98.4, pulse of 66, respiratory rate of 14, blood

pressure 100/68 and pulse oximetry 99%.

HEAD AND NECK EXAMINATION: Pallor. No jaundic. Pupils equal and reactive to

light.  Supple neck.  No lymphadenopathy.  No thyromegaly.

CHEST:  Clear to auscultation bilaterally.  No crackles or wheezes.

HEART: Regular rate and rhythm.  No murmurs.

ABDOMEN: Soft with tenderness mostly in the left upper quadrant and epigastric

area.  No rebound tenderness or rigidity.  Active bowel sounds in all four

quadrants.

EXTREMITIES: Normal pulses.  No edema.

NEUROLOGIC EXAMINATION:  Cranial nerves II-XII grossly intact.  No motor or

sensory deficits.

SKIN:  No rashes.

 

LABORATORY DATA:

Hemoglobin of 8.2, hematocrit 24.2 (normochromic normocytic picture), white

count of 15.2, platelet count 351,000.

 

PT/PTT within normal limits.

 

Chemistry within normal limits.

 

Albumin 2.3, total protein of 5.

 

Liver enzymes within normal limits.

 

ASSESSMENT:

56-year-old male patient who presented with 

1. Severe anemia and history of dark stools for several weeks.

2. A history of use of nonsteroidal anti-inflammatory drugs excessively for 
chronic back pain.

3. Hypertension, although his blood pressure at the current time on the low 
side.

4. Ex-smoker.

 

RECOMMENDATIONS:

1. I agree with the blood transfusion as given.

2. Monitor hemoglobin and hematocrit periodically.

3. IV proton pump inhibitor as prescribed.

4. Blood transfusion as needed.

5. Will schedule him for EGD and colonoscopy.

6. Will start bowel preparation today with clear liquid diet and keep him NPO

   after midnight.

 

 

The above assessment and plan was discussed with the patient. The risks,

benefits and possible complications were discussed with the patient, and will

proceed with EGD and colonoscopy in a.m.

 

Further recommendations to go to follow.  Thank you for the consult.

 

 

 

                              _________________________________

                              Daniela GORDON/PATTI

D:  5/27/2017/10:08 AM

T:  5/27/2017/12:40 PM

Visit #:  [id number]

Job #:  [id number]

FRANCOIS

## 2017-05-27 NOTE — EKG
Date Performed: 05/26/2017       Time Performed: 14:22:30

 

PTAGE:      56 years

 

EKG:      Sinus tachycardia. Septal T wave changes are nonspecific Borderline ECG 

 

NO PREVIOUS TRACING            

 

DOCTOR:   Bertrand Epps  Interpretating Date/Time  05/27/2017 16:20:31

## 2017-05-27 NOTE — HHI.PR
Subjective


Remarks


The patient was resting comfortably in bed.  He has not had any further bowel 

movements.  He says he is hungry.  He does complain of some pain underneath 

both of his ribs still.  He also has some chronic back pain.  Discussed with 

nursing.





Objective


Vitals





 Vital Signs








  Date Time  Temp Pulse Resp B/P Pulse Ox O2 Delivery O2 Flow Rate FiO2


 


5/27/17 08:00     100   21


 


5/27/17 06:00  66 14 92/66 99   


 


5/27/17 06:00  66      


 


5/27/17 05:00  68 12 100/68 99   


 


5/27/17 04:00  66      


 


5/27/17 04:00 98.4 66 14 92/66 99   


 


5/27/17 03:15 98.5 78 10 97/63 99   


 


5/27/17 03:00 98.0 87 10 97/63 99   


 


5/27/17 02:42   15     


 


5/27/17 02:00  82      


 


5/27/17 02:00 98.2 80 9 109/62 99   


 


5/27/17 02:00 98.4 80 9 109/70 99   


 


5/27/17 01:45  78 14  100   


 


5/27/17 01:45 98.2 82 14 102/68 99   


 


5/27/17 01:30  78 15 98/69 100   


 


5/27/17 01:30  78 15 98/69 100   


 


5/27/17 01:15  82 14  100   


 


5/27/17 01:01  78 9 89/54 100   


 


5/27/17 01:00  78 9 89/54 100   


 


5/27/17 01:00  82 13 82/56 100   


 


5/27/17 00:45  82 15  100   


 


5/27/17 00:30  82 18  100   


 


5/27/17 00:15  80 14  100   


 


5/27/17 00:00  78 11  100   


 


5/27/17 00:00  86      


 


5/27/17 00:00 98.0 78 15 96/71 100   


 


5/26/17 23:30 98.7 70 11 104/70 98   


 


5/26/17 23:15 98.8 79 19 98/62 100   


 


5/26/17 23:00 98.4 73 13 97/64 99   


 


5/26/17 23:00 98.4 73 13 97/64 99   


 


5/26/17 22:30  74 13  98   


 


5/26/17 22:00  76 9 85/56 100   


 


5/26/17 22:00  94      


 


5/26/17 21:15  86 12 89/62 99   


 


5/26/17 21:15 98.5       


 


5/26/17 21:00 97.8       


 


5/26/17 21:00  80 7 91/60 99   


 


5/26/17 21:00 98.4 99 14 91/60 14   


 


5/26/17 20:29  82 8 102/69 100   


 


5/26/17 20:00  80 12 96/66 99   


 


5/26/17 20:00  92      


 


5/26/17 19:29  82 11 101/67 100   


 


5/26/17 19:06     100   


 


5/26/17 19:00  90 11 112/75 100   


 


5/26/17 18:09 98.3 87 10 95/65 100   


 


5/26/17 16:55 98.2 91 20 110/75 100   


 


5/26/17 16:16  93 18 100/69 100 Room Air  


 


5/26/17 16:00     100   21


 


5/26/17 15:32  96  91/67 97 Room Air  


 


5/26/17 14:49     99 Room Air  


 


5/26/17 14:49     99 Room Air  


 


5/26/17 14:35  102 20 116/63 100 Room Air  


 


5/26/17 14:27 98.7 109 20 116/63 98   








 I/O








 5/26/17 5/26/17 5/26/17 5/27/17 5/27/17 5/27/17





 07:00 15:00 23:00 07:00 15:00 23:00


 


Intake Total   1648 ml 81 ml  


 


Output Total    700 ml  


 


Balance   1648 ml -619 ml  


 


      


 


Intake IV Total   1063 ml 66 ml  


 


Packed Cells   585 ml 15 ml  


 


Output Urine Total    700 ml  


 


# Voids   2   








Result Diagram:  


5/27/17 0543                                                                   

             5/27/17 0543





Imaging





Last Impressions








Chest X-Ray 5/26/17 0000 Signed





Impressions: 





 Service Date/Time:  Friday, May 26, 2017 16:42 - CONCLUSION: No acute disease.

   





     Chong Man MD 








Objective Remarks


GENERAL: This is a well-nourished, well-developed patient, in no apparent 

distress.


SKIN: No rashes, ecchymoses or lesions. Cool and dry.  


HEAD: Atraumatic. Normocephalic. No temporal or scalp tenderness.


EYES: Pupils equal round and reactive. Extraocular motions intact. No scleral 

icterus. No injection or drainage.  Conjunctival pallor.


ENT: Nose without bleeding, purulent drainage or septal hematoma. Throat 

without erythema, tonsillar hypertrophy or exudate. Uvula midline. Airway 

patent.


NECK: Trachea midline. No JVD or lymphadenopathy. Supple, nontender, no 

meningeal signs.


CARDIOVASCULAR: Regular rate and rhythm, grade 1 systolic murmur appreciated. 


RESPIRATORY: Clear to auscultation. Breath sounds equal bilaterally. No wheezes

, rales, or rhonchi.  


GASTROINTESTINAL: Abdomen soft, non-tender, nondistended. No hepato-splenomegaly

, or palpable masses. No guarding.


MUSCULOSKELETAL: Extremities without clubbing, cyanosis, or edema. No joint 

tenderness, effusion, or edema noted. 


NEUROLOGICAL: Awake and alert. Cranial nerves II through XII intact.  Motor and 

sensory grossly within normal limits. Five out of 5 muscle strength in all 

muscle groups.  Normal speech.


PSYCH: Mood and affect appropriate.


Medications and IVs





 Current Medications








 Medications


  (Trade)  Dose


 Ordered  Sig/Calvin


 Route  Start Time


 Stop Time Status Last Admin


 


 Pantoprazole


 Sodium 80 mg/


 Sodium Chloride  100 ml @ 


 10 mls/hr  Q10H


 IV  5/26/17 14:45


    5/27/17 01:00


 


 


  (D5W-1/2 NS 1000


 ml Inj)  1,000 ml @ 


 125 mls/hr  Q8H


 IV  5/26/17 16:15


    5/27/17 03:20


 


 


  (NS Flush)  2 ml  UNSCH  PRN


 IV FLUSH  5/26/17 16:15


     


 


 


  (NS Flush)  2 ml  BID


 IV FLUSH  5/26/17 21:00


    5/26/17 20:22


 


 


  (Tylenol)  650 mg  Q4H  PRN


 PO  5/26/17 16:15


     


 


 


  (Zofran Inj)  4 mg  Q6H  PRN


 IVP  5/26/17 16:15


     


 


 


  (Tylenol)  650 mg  Q6H  PRN


 PO  5/26/17 16:15


     


 


 


  (Morphine Inj)  2 mg  Q3H  PRN


 IV  5/26/17 16:15


    5/26/17 17:54


 


 


  (Roxicodone)  5 mg  Q4H  PRN


 PO  5/26/17 16:15


    5/27/17 01:42


 


 


  (Narcan Inj)  0.4 mg  UNSCH  PRN


 IV  5/26/17 16:15


     


 


 


 Miscellaneous


 Information  Patient in


 critical care


 unit?  Ass...  Q361D


 .XX  5/26/17 22:00


    5/26/17 22:00


 


 


  (Chlorhexidine


 2% Cloth)  3 pack  DAILY@04


 TOPICAL  5/27/17 04:00


 5/31/17 04:01  5/27/17 01:00


 


 


  (Chlorhexidine


 2% Cloth)  3 pack  UNSCH  PRN


 TOPICAL  5/26/17 21:30


 5/31/17 21:25   


 











A/P


Assessment and Plan


Acute blood loss anemia


The patient presents with weakness, lethargy and appears pale.  He has been 

taking up to 16 ibuprofen daily for quite some time.  Hemoglobin found to be 

4.9.  The patient likely has an upper GI bleed. S/p 4 units RBCs. Hgb 8.2.


- Continue IV Protonix drip.


- Follow CBC every 6 hours and transfuse as needed.


- IV fluids.


- Gastroenterology consult requested.


- Monitor in the ICU.


- advised to avoid NSAIDs.





Hypotension


Mild.  Secondary to hypovolemia from GI bleed.


- IV fluids.


- transfuse as needed.





Leukocytosis


The patient is afebrile.  Likely a stress reaction. Chest x-ray and UA 

unremarkable.


- follow CBC.





Nicotine dependence


The patient smokes one pack daily.


- Cessation instruction given.





Hypernatremia


Likely secondary to dehydration.


- continue D5 1/2 NS and monitor.





PPx: SCDs


Discharge Planning


Awaiting clinical improvement.








Chuck Harper DO May 27, 2017 09:31

## 2017-05-28 VITALS
HEART RATE: 68 BPM | OXYGEN SATURATION: 96 % | RESPIRATION RATE: 14 BRPM | TEMPERATURE: 98.3 F | DIASTOLIC BLOOD PRESSURE: 78 MMHG | SYSTOLIC BLOOD PRESSURE: 116 MMHG

## 2017-05-28 VITALS — OXYGEN SATURATION: 97 %

## 2017-05-28 VITALS
OXYGEN SATURATION: 99 % | DIASTOLIC BLOOD PRESSURE: 85 MMHG | SYSTOLIC BLOOD PRESSURE: 134 MMHG | RESPIRATION RATE: 14 BRPM | HEART RATE: 72 BPM

## 2017-05-28 VITALS
HEART RATE: 74 BPM | TEMPERATURE: 98.3 F | RESPIRATION RATE: 14 BRPM | DIASTOLIC BLOOD PRESSURE: 78 MMHG | SYSTOLIC BLOOD PRESSURE: 111 MMHG

## 2017-05-28 VITALS
TEMPERATURE: 100.2 F | SYSTOLIC BLOOD PRESSURE: 151 MMHG | OXYGEN SATURATION: 97 % | RESPIRATION RATE: 16 BRPM | DIASTOLIC BLOOD PRESSURE: 75 MMHG | HEART RATE: 76 BPM

## 2017-05-28 VITALS — HEART RATE: 78 BPM | SYSTOLIC BLOOD PRESSURE: 131 MMHG | RESPIRATION RATE: 21 BRPM | DIASTOLIC BLOOD PRESSURE: 85 MMHG

## 2017-05-28 VITALS
OXYGEN SATURATION: 100 % | HEART RATE: 76 BPM | SYSTOLIC BLOOD PRESSURE: 125 MMHG | RESPIRATION RATE: 18 BRPM | TEMPERATURE: 98.6 F | DIASTOLIC BLOOD PRESSURE: 95 MMHG

## 2017-05-28 VITALS — TEMPERATURE: 98.5 F

## 2017-05-28 VITALS — RESPIRATION RATE: 15 BRPM | DIASTOLIC BLOOD PRESSURE: 77 MMHG | HEART RATE: 86 BPM | SYSTOLIC BLOOD PRESSURE: 117 MMHG

## 2017-05-28 VITALS — HEART RATE: 68 BPM

## 2017-05-28 VITALS — HEART RATE: 74 BPM | DIASTOLIC BLOOD PRESSURE: 72 MMHG | SYSTOLIC BLOOD PRESSURE: 107 MMHG | RESPIRATION RATE: 16 BRPM

## 2017-05-28 VITALS — DIASTOLIC BLOOD PRESSURE: 79 MMHG | RESPIRATION RATE: 13 BRPM | SYSTOLIC BLOOD PRESSURE: 119 MMHG | HEART RATE: 76 BPM

## 2017-05-28 VITALS — HEART RATE: 86 BPM

## 2017-05-28 LAB
HCT VFR BLD CALC: 23.6 % (ref 39–51)
HCT VFR BLD CALC: 24.9 % (ref 39–51)
HCT VFR BLD CALC: 26.5 % (ref 39–51)
REVIEW FLAG: (no result)

## 2017-05-28 PROCEDURE — 0DB68ZX EXCISION OF STOMACH, VIA NATURAL OR ARTIFICIAL OPENING ENDOSCOPIC, DIAGNOSTIC: ICD-10-PCS | Performed by: SPECIALIST

## 2017-05-28 RX ADMIN — Medication SCH ML: at 21:23

## 2017-05-28 RX ADMIN — CHLORHEXIDINE GLUCONATE SCH PACK: 500 CLOTH TOPICAL at 04:00

## 2017-05-28 RX ADMIN — Medication SCH ML: at 09:24

## 2017-05-28 RX ADMIN — MAGNESIUM SULFATE HEPTAHYDRATE SCH MLS/HR: 500 INJECTION, SOLUTION INTRAMUSCULAR; INTRAVENOUS at 09:24

## 2017-05-28 RX ADMIN — PANTOPRAZOLE SCH MG: 40 TABLET, DELAYED RELEASE ORAL at 21:23

## 2017-05-28 RX ADMIN — CHLORHEXIDINE GLUCONATE SCH PACK: 500 CLOTH TOPICAL at 21:23

## 2017-05-28 NOTE — HHI.PR
Subjective


Remarks


The patient was feeling well.  He was about to go for his GI procedures.  He 

had no acute complaints.  Family at the bedside.





Objective


Vitals





 Vital Signs








  Date Time  Temp Pulse Resp B/P Pulse Ox O2 Delivery O2 Flow Rate FiO2


 


5/28/17 10:00     97   21


 


5/28/17 08:00 98.5       


 


5/28/17 07:00 98.5       


 


5/28/17 06:10  78 21 131/85    


 


5/28/17 06:00  86      


 


5/28/17 05:01  72 14 134/85 99   


 


5/28/17 04:29 98.3 68 14 116/78 96   


 


5/28/17 04:00  68      


 


5/28/17 03:00  76 13 119/79    


 


5/28/17 02:00  74      


 


5/28/17 02:00  74 16 107/72    


 


5/28/17 01:09  86 15 117/77    


 


5/28/17 00:00  74      


 


5/28/17 00:00 98.3 74 14 111/78    


 


5/27/17 23:00  82 15 128/81    


 


5/27/17 22:00  74      


 


5/27/17 22:00  74 14 123/63    


 


5/27/17 21:00  84 18 126/83    


 


5/27/17 20:00  80      


 


5/27/17 20:00 98.1 80 13 133/90    


 


5/27/17 19:15     100   


 


5/27/17 19:00    118/79    


 


5/27/17 16:15  68      


 


5/27/17 15:00  72 15 112/77 99   








 I/O








 5/27/17 5/27/17 5/27/17 5/28/17 5/28/17 5/28/17





 07:00 15:00 23:00 07:00 15:00 23:00


 


Intake Total 81 ml 2644 ml 1217 ml 1033 ml 300 ml 


 


Output Total 700 ml 775 ml    


 


Balance -619 ml 1869 ml 1217 ml 1033 ml 300 ml 


 


      


 


Intake Oral  1450 ml 400 ml 100 ml 300 ml 


 


IV Total 66 ml 1194 ml 817 ml 933 ml  


 


Packed Cells 15 ml     


 


Output Urine Total 700 ml 775 ml    


 


# Voids  2 5 5 1 


 


# Bowel Movements   5 5  








Result Diagram:  


5/28/17 1229                                                                   

             5/27/17 1825





Imaging





Last Impressions








Chest X-Ray 5/26/17 0000 Signed





Impressions: 





 Service Date/Time:  Friday, May 26, 2017 16:42 - CONCLUSION: No acute disease.

   





     Chong Man MD 








Objective Remarks


GENERAL: This is a well-nourished, well-developed patient, in no apparent 

distress.


SKIN: No rashes, ecchymoses or lesions. Cool and dry.  


HEAD: Atraumatic. Normocephalic. No temporal or scalp tenderness.


EYES: Pupils equal round and reactive. Extraocular motions intact. No scleral 

icterus. No injection or drainage.  Conjunctival pallor.


ENT: Nose without bleeding, purulent drainage or septal hematoma. Throat 

without erythema, tonsillar hypertrophy or exudate. Uvula midline. Airway 

patent.


NECK: Trachea midline. No JVD or lymphadenopathy. Supple, nontender, no 

meningeal signs.


CARDIOVASCULAR: Regular rate and rhythm, grade 1 systolic murmur appreciated. 


RESPIRATORY: Clear to auscultation. Breath sounds equal bilaterally. No wheezes

, rales, or rhonchi.  


GASTROINTESTINAL: Abdomen soft, non-tender, nondistended. No hepato-splenomegaly

, or palpable masses. No guarding.


MUSCULOSKELETAL: Extremities without clubbing, cyanosis, or edema. No joint 

tenderness, effusion, or edema noted. 


NEUROLOGICAL: Awake and alert. Cranial nerves II through XII intact.  Motor and 

sensory grossly within normal limits. Five out of 5 muscle strength in all 

muscle groups.  Normal speech.


PSYCH: Mood and affect appropriate.


Procedures


EGD 5/28


Medications and IVs





 Current Medications








 Medications


  (Trade)  Dose


 Ordered  Sig/Calvin


 Route  Start Time


 Stop Time Status Last Admin


 


  (NS Flush)  2 ml  UNSCH  PRN


 IV FLUSH  5/26/17 16:15


     


 


 


  (NS Flush)  2 ml  BID


 IV FLUSH  5/26/17 21:00


    5/28/17 09:24


 


 


  (Tylenol)  650 mg  Q4H  PRN


 PO  5/26/17 16:15


     


 


 


  (Zofran Inj)  4 mg  Q6H  PRN


 IVP  5/26/17 16:15


     


 


 


  (Tylenol)  650 mg  Q6H  PRN


 PO  5/26/17 16:15


     


 


 


  (Morphine Inj)  2 mg  Q3H  PRN


 IV  5/26/17 16:15


    5/26/17 17:54


 


 


  (Roxicodone)  5 mg  Q4H  PRN


 PO  5/26/17 16:15


    5/27/17 21:28


 


 


  (Narcan Inj)  0.4 mg  UNSCH  PRN


 IV  5/26/17 16:15


     


 


 


 Miscellaneous


 Information  Patient in


 critical care


 unit?  Ass...  Q361D


 .XX  5/26/17 22:00


    5/26/17 22:00


 


 


  (Chlorhexidine


 2% Cloth)  3 pack  DAILY@04


 TOPICAL  5/27/17 04:00


 5/31/17 04:01  5/28/17 04:00


 


 


  (Chlorhexidine


 2% Cloth)  3 pack  UNSCH  PRN


 TOPICAL  5/26/17 21:30


 5/31/17 21:25   


 


 


  (Protonix)  40 mg  Q12HR


 PO  5/28/17 21:00


     


 











A/P


Assessment and Plan


Acute blood loss anemia


The patient presents with weakness, lethargy and appears pale.  He has been 

taking up to 16 ibuprofen daily for quite some time.  Hemoglobin found to be 

4.9.  The patient likely has an upper GI bleed. S/p 4 units RBCs. Hgb 8.2.  

Gastroenterology consult appreciated.  Large, nonbleeding clean-based duodenal 

ulcer identified on EGD.


- Switch Protonix to by mouth.


- Follow CBC in a.m.


- Gastroenterology follow-up as an outpt.


- advised to avoid NSAIDs.





Hypotension


Mild.  Secondary to hypovolemia from GI bleed. Resolved.


- s/p fluids.


- transfuse as needed.





Leukocytosis


The patient is afebrile.  Likely a stress reaction. Chest x-ray and UA 

unremarkable.


- follow CBC. Resolved.





Nicotine dependence


The patient smokes one pack daily.


- Cessation instruction given.





Hypernatremia


Likely secondary to dehydration.


- continue D5 1/2 NS and monitor. Improved. 





PPx: SCDs


Discharge Planning


Anticipate discharge in the morning.








Chuck Harper DO May 28, 2017 14:34

## 2017-05-28 NOTE — MR
cc:

DANIELA CARRERA MD

****

 

 

DATE:  5/28/2017

 

TYPE OF PROCEDURE

Esophagogastroduodenoscopy with biopsy.

 

ANESTHESIA:

Anesthesia assisted.

 

ENDOSCOPIST:

KEVIN Carrera MD.

 

PROCEDURE

After explaining the procedure including risks, benefits and possible

complications, the patient signed the consent.  Then the patient was placed in

the left lateral position.  Proper sedation was given by anesthesiologist.

Then a scope passed over the tongue to the esophagus with careful inspection in

the way in.  The esophagus was inflated and inspected carefully.  The scope was

passed all the way down to the lower esophagus down to the stomach.  The

stomach was inflated and inspected carefully.  Clear gastric secretion noted

and no pathology identified.  Retroflexion view of the stomach appears to be

normal as well.

 

Then the scope passed all the way down to the duodenal bulb.  That appeared to

be normal.  At the pulsed bulbar area, a large giant ulcer was identified with

a clean base without evidence of active or recent bleeding.  The scope passed

carefully to the second part down to the superior part of the duodenum.  That

was inspected and appeared to be normal as well.  Then the scope was pulled out

carefully through the stomach.  Biopsies were obtained from the antrum to rule

out H. pylori.  The stomach was deflated and the procedure was terminated.  The

patient was kept in his room for observation.

 

COMPLICATIONS

None.

 

FINDINGS

Large clean based duodenal ulcer at the post bulbar area, with no evidence of

active or recent bleeding.

 

Normal stomach.  H. pylori biopsy was obtained.

 

Normal esophagus.

 

RECOMMENDATIONS

1. Await biopsy results.

2. Continue proton pump inhibitor.

3. Avoid nonsteroidal anti-inflammatory drugs.

4. Patient will need an outpatient screen and colonoscopy.

 

 

 

                              _________________________________

                              Daniela GORDON/ERCI

D:  5/28/2017/10:24 AM

T:  5/28/2017/11:02 AM

Visit #:  [id number]

Job #:  [id number]

FRANCOIS

## 2017-05-29 VITALS
HEART RATE: 82 BPM | RESPIRATION RATE: 20 BRPM | DIASTOLIC BLOOD PRESSURE: 74 MMHG | OXYGEN SATURATION: 98 % | TEMPERATURE: 97.6 F | SYSTOLIC BLOOD PRESSURE: 117 MMHG

## 2017-05-29 VITALS
RESPIRATION RATE: 18 BRPM | SYSTOLIC BLOOD PRESSURE: 119 MMHG | OXYGEN SATURATION: 98 % | DIASTOLIC BLOOD PRESSURE: 82 MMHG | HEART RATE: 86 BPM | TEMPERATURE: 100.2 F

## 2017-05-29 VITALS
SYSTOLIC BLOOD PRESSURE: 118 MMHG | TEMPERATURE: 97.9 F | HEART RATE: 72 BPM | OXYGEN SATURATION: 99 % | DIASTOLIC BLOOD PRESSURE: 83 MMHG | RESPIRATION RATE: 20 BRPM

## 2017-05-29 VITALS
HEART RATE: 86 BPM | RESPIRATION RATE: 18 BRPM | TEMPERATURE: 100.2 F | SYSTOLIC BLOOD PRESSURE: 119 MMHG | DIASTOLIC BLOOD PRESSURE: 82 MMHG | OXYGEN SATURATION: 98 %

## 2017-05-29 VITALS — TEMPERATURE: 99.7 F

## 2017-05-29 LAB
ANION GAP SERPL CALC-SCNC: 7 MEQ/L (ref 5–15)
BUN SERPL-MCNC: 5 MG/DL (ref 7–18)
CHLORIDE SERPL-SCNC: 113 MEQ/L (ref 98–107)
COLOR UR: YELLOW
COMMENT (UR): (no result)
CULTURE IF INDICATED: (no result)
ERYTHROCYTE [DISTWIDTH] IN BLOOD BY AUTOMATED COUNT: 15.3 % (ref 11.6–17.2)
GFR SERPLBLD BASED ON 1.73 SQ M-ARVRAT: 79 ML/MIN (ref 89–?)
GLUCOSE UR STRIP-MCNC: (no result) MG/DL
HCO3 BLD-SCNC: 25.9 MEQ/L (ref 21–32)
HCT VFR BLD CALC: 25 % (ref 39–51)
HGB UR QL STRIP: (no result)
KETONES UR STRIP-MCNC: (no result) MG/DL
MAGNESIUM SERPL-MCNC: 1.6 MG/DL (ref 1.5–2.5)
MCH RBC QN AUTO: 29.1 PG (ref 27–34)
MCHC RBC AUTO-ENTMCNC: 33.3 % (ref 32–36)
MCV RBC AUTO: 87.3 FL (ref 80–100)
METHOD OF COLLECTION: (no result)
NITRITE UR QL STRIP: (no result)
PLATELET # BLD: 269 TH/MM3 (ref 150–450)
POTASSIUM SERPL-SCNC: 3.3 MEQ/L (ref 3.5–5.1)
RBC # BLD AUTO: 2.87 MIL/MM3 (ref 4.5–5.9)
REVIEW FLAG: (no result)
SODIUM SERPL-SCNC: 146 MEQ/L (ref 136–145)
SP GR UR STRIP: 1.01 (ref 1–1.03)
SQUAMOUS #/AREA URNS HPF: (no result) /HPF (ref 0–5)
WBC # BLD AUTO: 10 TH/MM3 (ref 4–11)

## 2017-05-29 RX ADMIN — Medication SCH ML: at 09:38

## 2017-05-29 RX ADMIN — PANTOPRAZOLE SCH MG: 40 TABLET, DELAYED RELEASE ORAL at 09:38

## 2017-05-29 NOTE — HHI.DCPOC
Discharge Care Plan


Diagnosis:  


(1) GI bleed due to NSAIDs


Goals to Promote Your Health


* To prevent worsening of your condition and complications


* To maintain your health at the optimal level


Directions to Meet Your Goals


*** Take your medications as prescribed


*** Follow your dietary instruction


*** Follow activity as directed








*** Keep your appointments as scheduled


*** Take your immunizations and boosters as scheduled


*** If your symptoms worsen call your PCP, if no PCP go to Urgent Care Center 

or Emergency Room***


*** Smoking is Dangerous to Your Health. Avoid second hand smoke***


***Call the 24-hour hour crisis hotline for domestic abuse at 1-888.582.1257***








Chuck Harper DO May 29, 2017 08:49

## 2017-05-29 NOTE — RADHPO
EXAM DATE/TIME:  05/29/2017 10:03 

 

[facility] COMPARISON:     

CHEST SINGLE AP, May 26, 2017, 16:42.

 

                     

INDICATIONS :     

Infiltrates.

                     

 

MEDICAL HISTORY :            

Anemia.   

 

SURGICAL HISTORY :        

Stomach exploratory.

 

ENCOUNTER:     

Sequela                                        

 

ACUITY:     

1 day      

 

PAIN SCORE:     

5/10

 

LOCATION:     

Bilateral  Back and stomach

 

FINDINGS:     

Portable AP view of the chest demonstrates a normal-sized cardiac silhouette. No effusion, consolidat
ion, or pneumothorax is visualized. The bones and soft tissues demonstrate no acute abnormality. Lung
s are underinflated.

 

CONCLUSION:     

No acute cardiopulmonary abnormality is identified.

 

 

 

 Chong Jiménez MD on May 29, 2017 at 10:16           

Board Certified Radiologist.

 This report was verified electronically.

## 2017-05-29 NOTE — HHI.DS
__________________________________________________





Discharge Summary


Admission Date


May 26, 2017 at 15:39


Discharge Date:  May 29, 2017


Admitting Diagnosis


ANEMIA, GI BLEED





(1) GI bleed due to NSAIDs


ICD Code:  K92.2


Diagnosis:  Principal





Procedures


EGD 5/28


Brief History - From Admission


The patient is a 56-year-old male who is presenting to the hospital with 

weakness, lethargy and abdominal pain.  The patient says that a couple of 

months ago he hurt his back while lifting something heavy and since then he has 

been taking up to 16 ibuprofen tablets daily.  The patient has been 

experiencing pain underneath both of his ribs for the past week.  Also over the 

past week he has noticed his stools appearing dark in color and more loose in 

consistency.  He has been expressing bouts of dizziness starting last night, 

and he also had 2 bouts of dizziness this morning.  He says he has no energy.  

He says he started to look pale starting today.  He denies any previous history 

of GI bleeding.  The patient mentions that his back pain is still present.  He 

also describes a numb sensation on the bottom of his right foot that has been 

there for a long time.  He says over the past few weeks that sensation has 

started to creep up to his right knee.  He denies the back pain radiating down 

his legs.  The patient has not had much of an appetite today.


CBC/BMP:  


5/29/17 0607                                                                   

             5/29/17 0607





Significant Findings





Laboratory Tests








Test 5/26/17 5/27/17 5/27/17 5/27/17





 14:42 01:10 05:43 10:05


 


White Blood Count 15.2 TH/MM3   





 (4.0-11.0)   


 


Red Blood Count 1.65 MIL/MM3   2.85 MIL/MM3





 (4.50-5.90)   (4.50-5.90)


 


Hemoglobin 4.9 GM/DL 7.4 GM/DL 8.2 GM/DL 8.3 GM/DL





 (13.0-17.0) (13.0-17.0) (13.0-17.0) (13.0-17.0)


 


Hematocrit 14.6 % 22.1 % 24.2 % 25.3 %





 (39.0-51.0) (39.0-51.0) (39.0-51.0) (39.0-51.0)


 


Neutrophils (%) (Auto) 72.6 %   70.5 %





 (16.0-70.0)   (16.0-70.0)


 


Neutrophils # (Auto) 11.0 TH/MM3   





 (1.8-7.7)   


 


Chloride Level 113 MEQ/L  117 MEQ/L 





 ()  () 


 


Blood Urea Nitrogen 22 MG/DL (7-18)   


 


Estimat Glomerular Filtration 70 ML/MIN (>89)  70 ML/MIN (>89) 





Rate    


 


Total Protein 5.5 GM/DL  5.0 GM/DL 





 (6.4-8.2)  (6.4-8.2) 


 


Albumin 2.5 GM/DL  2.3 GM/DL 





 (3.4-5.0)  (3.4-5.0) 


 


Sodium Level   147 MEQ/L 





   (136-145) 


 


Calcium Level   8.4 MG/DL 





   (8.5-10.1) 


 


    





Test 5/27/17 5/28/17 5/28/17 5/28/17





 18:25 02:33 05:49 12:29


 


Hemoglobin 8.4 GM/DL 8.1 GM/DL 8.2 GM/DL 8.8 GM/DL





 (13.0-17.0) (13.0-17.0) (13.0-17.0) (13.0-17.0)


 


Hematocrit 25.5 % 23.6 % 24.9 % 26.5 %





 (39.0-51.0) (39.0-51.0) (39.0-51.0) (39.0-51.0)


 


Sodium Level 146 MEQ/L   





 (136-145)   


 


Chloride Level 118 MEQ/L   





 ()   


 


Estimat Glomerular Filtration 70 ML/MIN (>89)   





Rate    


 


    





Test 5/29/17   





 06:07   


 


Red Blood Count 2.87 MIL/MM3   





 (4.50-5.90)   


 


Hemoglobin 8.4 GM/DL   





 (13.0-17.0)   


 


Hematocrit 25.0 %   





 (39.0-51.0)   


 


Sodium Level 146 MEQ/L   





 (136-145)   


 


Potassium Level 3.3 MEQ/L   





 (3.5-5.1)   


 


Chloride Level 113 MEQ/L   





 ()   


 


Blood Urea Nitrogen 5 MG/DL (7-18)   


 


Estimat Glomerular Filtration 79 ML/MIN (>89)   





Rate    








Imaging





Last Impressions








Chest X-Ray 5/26/17 0000 Signed





Impressions: 





 Service Date/Time:  Friday, May 26, 2017 16:42 - CONCLUSION: No acute disease.

   





     Chong Man MD 








PE at Discharge


GENERAL: This is a well-nourished, well-developed patient, in no apparent 

distress.


SKIN: No rashes, ecchymoses or lesions. Cool and dry.  


HEAD: Atraumatic. Normocephalic. No temporal or scalp tenderness.


EYES: Pupils equal round and reactive. Extraocular motions intact. No scleral 

icterus. No injection or drainage.  Conjunctival pallor.


ENT: Nose without bleeding, purulent drainage or septal hematoma. Throat 

without erythema, tonsillar hypertrophy or exudate. Uvula midline. Airway 

patent.


NECK: Trachea midline. No JVD or lymphadenopathy. Supple, nontender, no 

meningeal signs.


CARDIOVASCULAR: Regular rate and rhythm, grade 1 systolic murmur appreciated. 


RESPIRATORY: Clear to auscultation. Breath sounds equal bilaterally. No wheezes

, rales, or rhonchi.  


GASTROINTESTINAL: Abdomen soft, non-tender, nondistended. No hepato-splenomegaly

, or palpable masses. No guarding.


MUSCULOSKELETAL: Extremities without clubbing, cyanosis, or edema. No joint 

tenderness, effusion, or edema noted. 


NEUROLOGICAL: Awake and alert. Cranial nerves II through XII intact.  Motor and 

sensory grossly within normal limits. Five out of 5 muscle strength in all 

muscle groups.  Normal speech.


PSYCH: Mood and affect appropriate.


Pt update on day of discharge


The pt was feeling well and had no acute complaints. He had questions about 

things he should avoid in order to help his ulcer heal. He denied shortness of 

breath or dysuria. Still with cramping abdominal pain.


Hospital Course


Acute blood loss anemia


The patient presented with weakness, lethargy and appeared pale.  He had been 

taking up to 16 ibuprofen daily for quite some time.  Hemoglobin found to be 

4.9 on presentation.  S/p 4 units RBCs. Hgb has remained stable.  He was 

started on a Protonix gtt. Gastroenterology was consulted. EGD revealed a large

, nonbleeding clean-based duodenal ulcer. He was advised to avoid NSAIDs. He 

will continue Protonix 40 mg PO BID. He will have a repeat CBC in 3-5 days. He 

will follow up with GI as an outpt.





Hypotension


Resolved with IVFs and blood product administration.





Leukocytosis


Resolved. Chest x-ray and UA unremarkable.





Nicotine dependence


The patient smokes one pack daily. Cessation instruction given.


Pt Condition on Discharge:  Stable


Discharge Disposition:  Discharge Home


Discharge Time:  > 30 minutes


Discharge Instructions


Follow up Referrals:  


Gastroenterology - 2 Weeks with Haseeb Martin MD


PCP Follow-up - 1 Week





New Orders:  


CBC NO DIFF - 3-5 Days





New Medications:  


Oxycodone (Oxycodone) 5 Mg Tab


5 MG PO Q6HR PRN PAIN SCALE 3 TO 10 #12 TAB


Pantoprazole (Protonix) 40 Mg Tab


40 MG PO Q12HR Ulcer #60 TAB











Cuhck Harper DO May 29, 2017 08:50

## 2017-05-29 NOTE — HHI.GIFU
Subjective


Remarks


Still having mild abdominal pain under the rib, unchanged from before, 

tolerating food well and no bleeding per rectum.





Objective


Vitals I&O





 Vital Signs








  Date Time  Temp Pulse Resp B/P Pulse Ox O2 Delivery O2 Flow Rate FiO2


 


5/29/17 04:38 100.2 86 18 119/82 98   


 


5/29/17 01:03 99.7       


 


5/29/17 00:25 100.2 86 18 119/82 98   


 


5/28/17 22:17 100.2 76 16 151/75 97   


 


5/28/17 16:00 98.6 76 18 125/95 100   


 


5/28/17 16:00   18     


 


5/28/17 10:00     97   21








 I/O








 5/28/17 5/28/17 5/28/17 5/29/17 5/29/17 5/29/17





 07:00 15:00 23:00 07:00 15:00 23:00


 


Intake Total 1033 ml 300 ml 360 ml   


 


Balance 1033 ml 300 ml 360 ml   


 


      


 


Intake Oral 100 ml 300 ml 360 ml   


 


IV Total 933 ml     


 


# Voids 5 1 2 3  


 


# Bowel Movements 5  1   








Laboratory





Laboratory Tests








Test 5/28/17 5/29/17





 12:29 06:07


 


Hemoglobin 8.8  8.4 


 


Hematocrit 26.5  25.0 


 


White Blood Count  10.0 


 


Red Blood Count  2.87 


 


Mean Corpuscular Volume  87.3 


 


Mean Corpuscular Hemoglobin  29.1 


 


Mean Corpuscular Hemoglobin  33.3 





Concent  


 


Red Cell Distribution Width  15.3 


 


Platelet Count  269 


 


Mean Platelet Volume  8.4 


 


Sodium Level  146 


 


Potassium Level  3.3 


 


Chloride Level  113 


 


Carbon Dioxide Level  25.9 


 


Anion Gap  7 


 


Blood Urea Nitrogen  5 


 


Creatinine  0.99 


 


Estimat Glomerular Filtration  79 





Rate  


 


Random Glucose  106 


 


Calcium Level  8.6 


 


Magnesium Level  1.6 








Physical Exam


HEENT: Pupils round and reactive to light; normocephalic; atraumatic; no 

jaundice.  Throat is clear.


NECK: Neck is supple, no JVD, no lymphadenopathy.


CHEST:  Chest is clear to auscultation and percussion.


CARDIAC:  Regular rate and rhythm with no murmur gallop or rubs.


ABDOMEN:  Soft, nondistended, nontender; no hepatosplenomegaly; bowel sounds 

are present in all four quadrants.


EXTREMITIES: No clubbing, cyanosis, or edema.





Assessment and Plan


Plan


1. Large post bulbar duodenal ulcer, clean based and no stigmata of recent or 

active bleeding


2. Severe anemia and history of dark stools for several weeks.


2. A history of use of nonsteroidal anti-inflammatory drugs excessively for 

chronic back pain.


3. Hypertension, although his blood pressure at the current time on the low 

side.


4. Ex-smoker.


 


RECOMMENDATIONS:


1. Await biopsy results


2. PPI


3. Avoid NSAIDs


4. Re scope to check healing after 4-6 weeks


5. Screening Colonoscopy as out patient ( not prep'ed enough during this 

hospitalization)








Haseeb Martin MD May 29, 2017 09:14

## 2017-07-24 ENCOUNTER — HOSPITAL ENCOUNTER (EMERGENCY)
Dept: HOSPITAL 17 - PHED | Age: 57
Discharge: HOME | End: 2017-07-24
Payer: OTHER GOVERNMENT

## 2017-07-24 VITALS
HEART RATE: 105 BPM | SYSTOLIC BLOOD PRESSURE: 110 MMHG | DIASTOLIC BLOOD PRESSURE: 85 MMHG | RESPIRATION RATE: 22 BRPM | OXYGEN SATURATION: 99 % | TEMPERATURE: 98.2 F

## 2017-07-24 VITALS
OXYGEN SATURATION: 99 % | HEART RATE: 105 BPM | TEMPERATURE: 98.2 F | DIASTOLIC BLOOD PRESSURE: 85 MMHG | RESPIRATION RATE: 22 BRPM | SYSTOLIC BLOOD PRESSURE: 110 MMHG

## 2017-07-24 VITALS — HEIGHT: 70 IN | BODY MASS INDEX: 31.56 KG/M2 | WEIGHT: 220.46 LBS

## 2017-07-24 VITALS
OXYGEN SATURATION: 98 % | SYSTOLIC BLOOD PRESSURE: 144 MMHG | RESPIRATION RATE: 16 BRPM | HEART RATE: 87 BPM | DIASTOLIC BLOOD PRESSURE: 99 MMHG

## 2017-07-24 DIAGNOSIS — G89.29: ICD-10-CM

## 2017-07-24 DIAGNOSIS — Z86.79: ICD-10-CM

## 2017-07-24 DIAGNOSIS — I10: ICD-10-CM

## 2017-07-24 DIAGNOSIS — F17.200: ICD-10-CM

## 2017-07-24 DIAGNOSIS — Z87.19: ICD-10-CM

## 2017-07-24 DIAGNOSIS — M54.5: Primary | ICD-10-CM

## 2017-07-24 DIAGNOSIS — Z87.09: ICD-10-CM

## 2017-07-24 DIAGNOSIS — M54.6: ICD-10-CM

## 2017-07-24 PROCEDURE — 99283 EMERGENCY DEPT VISIT LOW MDM: CPT

## 2017-07-24 NOTE — PD
HPI


Chief Complaint:  Pain: Acute or Chronic


Time Seen by Provider:  10:09


Travel History


International Travel<30 days:  No


Contact w/Intl Traveler<30days:  No


Traveled to known affect area:  No





History of Present Illness


HPI


The patient is 57 years old.  He reports chronic low back pain for the past 2 

years.  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  He was 

pulling a lawnmower when he felt a sudden onset pain.  [facility]  He reports chronic right lower extremity numbness.  

Tramadol and gabapentin have conferred minimal pain relief.  About 4 days ago 

the patient was performing a fair amount of lifting and maneuvering in and out 

of cars while working mowing lawns.  While getting into bed a few nights ago he 

developed markedly worse pain which has been constant since. Patient has had no 

urinary overflow incontinence or fecal incontinence.  He denies perianal/

perineal numbness.  He's had no numbness or weakness of either lower extremity.

  He's had no fever.





PFSH


Past Medical History


Arthritis:  No


Asthma:  No


Heart Rhythm Problems:  No


Cardiovascular Problems:  Yes


High Cholesterol:  No


Chest Pain:  No


Congestive Heart Failure:  No


COPD:  Yes


Cerebrovascular Accident:  No


GERD:  No


Genitourinary:  No


Hiatal Hernia:  No


Hypertension:  Yes


Kidney Stones:  No


Musculoskeletal:  No


Neurologic:  No


Reproductive:  No


Respiratory:  Yes


Migraines:  No


Renal Failure:  No


Seizures:  No


Sleep Apnea:  No


Ulcer:  Yes


Tetanus Vaccination:  < 5 Years


Influenza Vaccination:  No





Past Surgical History


Surgical History:  No Previous Surgery


Abdominal Surgery:  No


Cardiac Surgery:  No


Ear Surgery:  No


Endocrine Surgery:  No


Eye Surgery:  No


Genitourinary Surgery:  No


Gynecologic Surgery:  No


Oral Surgery:  No


Thoracic Surgery:  Yes





Social History


Alcohol Use:  No (Quit)


Tobacco Use:  Yes (1 PPD)


Substance Use:  No (Denies)





Allergies-Medications


(Allergen,Severity, Reaction):  


Coded Allergies:  


     Flexeril (Verified  Allergy, Severe, Swelling, hives, 7/24/17)


     Naproxen (Verified  Allergy, Severe, Swelling, hives, 7/24/17)


     Penicillin (Verified  Allergy, Severe, Hives, 7/24/17)


     Nonsteroidal Anti-Inflammatory Agts (Verified  Adverse Reaction, Severe, 

GI bleeding , 7/24/17)


Reported Meds & Prescriptions





Reported Meds & Active Scripts


Active


Oxycodone (Oxycodone HCl) 5 Mg Tab 5 Mg PO Q6HR PRN


Reported


Tramadol (Tramadol HCl) 50 Mg Tab 50 Mg PO Q8H PRN


Gabapentin 600 Mg Tab 600 Mg PO TID








Review of Systems


Except as stated in HPI:  all other systems reviewed are Neg


General / Constitutional:  No: Fever


Musculoskeletal:  No: Weakness


Neurologic:  Positive: Other (paresthesia of the right foot chronic in nature)





Physical Exam


Narrative


GENERAL: He 7-year-old male well-nourished well-developed


SKIN: Focused skin assessment warm/dry.


HEAD: Atraumatic. Normocephalic. 


EYES: Pupils equal and round. No scleral icterus. No injection or drainage. 


ENT: No nasal bleeding or discharge.  Mucous membranes pink and moist.


NECK: Trachea midline. No JVD. 


CARDIOVASCULAR: Regular rate and rhythm.  No murmur appreciated.


RESPIRATORY: No accessory muscle use. Clear to auscultation. Breath sounds 

equal bilaterally. 


GASTROINTESTINAL: Abdomen soft, non-tender, nondistended. Hepatic and splenic 

margins not palpable. 


MUSCULOSKELETAL: No obvious deformities. No clubbing.  No cyanosis.  No edema. 


NEUROLOGICAL: Hip flexion is equal bilaterally.  Flexion extension at the ankle 

and great toe is normal and equal bilaterally.  Knee flexion extension is 

normal and equal bilaterally.  2+ DTRs bilaterally.  There is no focus of 

midline spinal tenderness however patient has pain with palpation essentially 

anywhere on the back from the region of T1 to the region of S1 in the 

paraspinal and midline spinal distributions.


PSYCHIATRIC: Appropriate mood and affect; insight and judgment normal.





Data


Data


Last Documented VS





Vital Signs








  Date Time  Temp Pulse Resp B/P Pulse Ox O2 Delivery O2 Flow Rate FiO2


 


7/24/17 11:40  87 16 144/99 98 Room Air  


 


7/24/17 10:15 98.2       





VS reviewed


Orders





 Oxycodone-Acetamin 5-325 Mg (Percocet (7/24/17 10:30)


Ketorolac Inj (Toradol Inj) (7/24/17 10:30)


Ketorolac Inj (Toradol Inj) (7/24/17 10:30)








MDM


Medical Decision Making


Medical Screen Exam Complete:  Yes


Emergency Medical Condition:  Yes


Medical Record Reviewed:  Yes


Differential Diagnosis


Myofascial strain, DJD, epidural abscess, paraspinal hematoma, cauda equina 

syndrome, acute on chronic pain


Narrative Course


1055AM: pt found asleep on bed, pt reports minimal pain relief about 10-20 

minutes after Percocet 5/325 x 2; requests blanket


1140AM: pt reports decreased pain severity; return precautions discussed in 

detail, appropriate follow up plans discussed in detail





Diagnosis





 Primary Impression:  


 Back pain, chronic


 Qualified Code:  M54.9 - Chronic midline back pain, unspecified back location


Referrals:  


[facility]


2 days





***Additional Instructions:


You have a choice when it comes to health care, and we are glad that you chose 

[facility]. Hopefully, we have met your expectations on today's visit.  You 

are welcome to return to [facility] at any time, as we are committed to 

meeting the health care needs of our community.


***Med/Other Pt SpecificInfo:  Prescription(s) given


Scripts


Oxycodone 5 Mg Tab5 Mg PO Q6HR PRN (PAIN SCALE 3 TO 10) #15 TAB


   Prov:Zachary Mccabe MD         7/24/17


Disposition:  01 DISCHARGE HOME


Condition:  Stable








Zachary Mccabe MD Jul 24, 2017 10:29

## 2018-03-19 ENCOUNTER — HOSPITAL ENCOUNTER (EMERGENCY)
Dept: HOSPITAL 17 - PHED | Age: 58
LOS: 1 days | Discharge: HOME | End: 2018-03-20
Payer: OTHER GOVERNMENT

## 2018-03-19 VITALS — HEIGHT: 69 IN | BODY MASS INDEX: 32.78 KG/M2 | WEIGHT: 221.34 LBS

## 2018-03-19 VITALS
TEMPERATURE: 98.8 F | DIASTOLIC BLOOD PRESSURE: 72 MMHG | RESPIRATION RATE: 18 BRPM | OXYGEN SATURATION: 97 % | SYSTOLIC BLOOD PRESSURE: 122 MMHG | HEART RATE: 79 BPM

## 2018-03-19 VITALS
OXYGEN SATURATION: 97 % | DIASTOLIC BLOOD PRESSURE: 72 MMHG | SYSTOLIC BLOOD PRESSURE: 122 MMHG | TEMPERATURE: 98.8 F | RESPIRATION RATE: 18 BRPM | HEART RATE: 79 BPM

## 2018-03-19 VITALS
HEART RATE: 75 BPM | DIASTOLIC BLOOD PRESSURE: 80 MMHG | OXYGEN SATURATION: 97 % | SYSTOLIC BLOOD PRESSURE: 144 MMHG | RESPIRATION RATE: 16 BRPM

## 2018-03-19 VITALS
RESPIRATION RATE: 16 BRPM | OXYGEN SATURATION: 97 % | DIASTOLIC BLOOD PRESSURE: 76 MMHG | HEART RATE: 72 BPM | SYSTOLIC BLOOD PRESSURE: 109 MMHG

## 2018-03-19 DIAGNOSIS — Z72.0: ICD-10-CM

## 2018-03-19 DIAGNOSIS — Z88.0: ICD-10-CM

## 2018-03-19 DIAGNOSIS — S90.129A: Primary | ICD-10-CM

## 2018-03-19 DIAGNOSIS — M79.89: ICD-10-CM

## 2018-03-19 DIAGNOSIS — L03.115: ICD-10-CM

## 2018-03-19 DIAGNOSIS — I10: ICD-10-CM

## 2018-03-19 DIAGNOSIS — Z88.6: ICD-10-CM

## 2018-03-19 DIAGNOSIS — J44.9: ICD-10-CM

## 2018-03-19 LAB
BASOPHILS # BLD AUTO: 0.4 TH/MM3 (ref 0–0.2)
BASOPHILS NFR BLD: 4.8 % (ref 0–2)
EOSINOPHIL # BLD: 0.3 TH/MM3 (ref 0–0.4)
EOSINOPHIL NFR BLD: 4.1 % (ref 0–4)
ERYTHROCYTE [DISTWIDTH] IN BLOOD BY AUTOMATED COUNT: 17.7 % (ref 11.6–17.2)
HCT VFR BLD CALC: 41.8 % (ref 39–51)
HGB BLD-MCNC: 13.6 GM/DL (ref 13–17)
INR PPP: 1.1 RATIO
LYMPHOCYTES # BLD AUTO: 1.8 TH/MM3 (ref 1–4.8)
LYMPHOCYTES NFR BLD AUTO: 21.5 % (ref 9–44)
MCH RBC QN AUTO: 25.4 PG (ref 27–34)
MCHC RBC AUTO-ENTMCNC: 32.6 % (ref 32–36)
MCV RBC AUTO: 77.7 FL (ref 80–100)
MONOCYTE #: 0.8 TH/MM3 (ref 0–0.9)
MONOCYTES NFR BLD: 9.5 % (ref 0–8)
NEUTROPHILS # BLD AUTO: 5 TH/MM3 (ref 1.8–7.7)
NEUTROPHILS NFR BLD AUTO: 60.1 % (ref 16–70)
PLATELET # BLD: 278 TH/MM3 (ref 150–450)
PMV BLD AUTO: 7.9 FL (ref 7–11)
PROTHROMBIN TIME: 11.5 SEC (ref 9.8–11.6)
RBC # BLD AUTO: 5.38 MIL/MM3 (ref 4.5–5.9)
WBC # BLD AUTO: 8.3 TH/MM3 (ref 4–11)

## 2018-03-19 PROCEDURE — 83605 ASSAY OF LACTIC ACID: CPT

## 2018-03-19 PROCEDURE — 96365 THER/PROPH/DIAG IV INF INIT: CPT

## 2018-03-19 PROCEDURE — 99284 EMERGENCY DEPT VISIT MOD MDM: CPT

## 2018-03-19 PROCEDURE — 85610 PROTHROMBIN TIME: CPT

## 2018-03-19 PROCEDURE — 93971 EXTREMITY STUDY: CPT

## 2018-03-19 PROCEDURE — 87040 BLOOD CULTURE FOR BACTERIA: CPT

## 2018-03-19 PROCEDURE — 73630 X-RAY EXAM OF FOOT: CPT

## 2018-03-19 PROCEDURE — 96367 TX/PROPH/DG ADDL SEQ IV INF: CPT

## 2018-03-19 PROCEDURE — 85025 COMPLETE CBC W/AUTO DIFF WBC: CPT

## 2018-03-19 PROCEDURE — 85730 THROMBOPLASTIN TIME PARTIAL: CPT

## 2018-03-19 PROCEDURE — 80048 BASIC METABOLIC PNL TOTAL CA: CPT

## 2018-03-19 NOTE — PD
HPI


Chief Complaint:  Pain: Acute or Chronic


Time Seen by Provider:  22:04


Travel History


International Travel<30 days:  No


Contact w/Intl Traveler<30days:  No


Traveled to known affect area:  No





History of Present Illness


HPI


57-year-old male [facility] 

[facility]uation of possible right lower extremity DVT and left second toe/foot 

fracture.  According the patient sometime within the past 7-10 days he contused 

his left second toe against furniture and his home and then more recently has 

noted swelling of the right lower extremity with redness and mild discoloration 

no pallor or coolness.  Patient denies any known injury.  Patient denies any 

previous history of clotting disorder.  Patient denies any connective tissue 

disorder or autoimmune disorder.  Patient denies family history of clotting 

disorder.  Patient's had no recent long distance travel protracted bedrest or 

surgical procedure.  Patient was hospitalized approximately 1 year ago 5/2017 

with GI bleed requiring transfusion.  Patient is unable to take nonsteroidal 

anti-inflammatory medications.  Patient rates discomfort 5/10 intensity.  

Patient denies any shortness of breath chest pain or pleuritic chest pain.  

Patient's had no hemoptysis.  Patient denies any recent respiratory illness.  

Patient does not report any claudication of the lower extremities.





PFSH


Past Medical History


*** Narrative Medical


COPD hypertension GI bleed blood transfusion tobaccoism; nursing notes reviewed


Arthritis:  No


Asthma:  No


Heart Rhythm Problems:  No


Cardiovascular Problems:  Yes


High Cholesterol:  No


Chest Pain:  No


Congestive Heart Failure:  No


COPD:  Yes


Cerebrovascular Accident:  No


GERD:  No


Genitourinary:  No


Hiatal Hernia:  No


Hypertension:  Yes


Kidney Stones:  No


Musculoskeletal:  No


Neurologic:  No


Reproductive:  No


Respiratory:  Yes


Migraines:  No


Renal Failure:  No


Seizures:  No


Sleep Apnea:  No


Ulcer:  Yes


Influenza Vaccination:  No





Past Surgical History


Abdominal Surgery:  No


Cardiac Surgery:  No


Ear Surgery:  No


Endocrine Surgery:  No


Eye Surgery:  No


Genitourinary Surgery:  No


Gynecologic Surgery:  No


Oral Surgery:  No


Thoracic Surgery:  Yes





Social History


Alcohol Use:  No (Quit)


Tobacco Use:  Yes (1 PPD)


Substance Use:  No (Denies)





Allergies-Medications


(Allergen,Severity, Reaction):  


Coded Allergies:  


     cyclobenzaprine (Verified  Allergy, Severe, Swelling, hives, 3/19/18)


     penicillin G (Verified  Allergy, Severe, Hives, 3/19/18)


     diclofenac (Verified  Adverse Reaction, Severe, GI bleeding , 3/19/18)


     etodolac (Verified  Adverse Reaction, Severe, GI bleeding , 3/19/18)


     flurbiprofen (Verified  Adverse Reaction, Severe, GI bleeding , 3/19/18)


     ibuprofen (Verified  Adverse Reaction, Severe, GI bleeding , 3/19/18)


     indomethacin (Verified  Adverse Reaction, Severe, GI bleeding , 3/19/18)


     ketoprofen (Verified  Adverse Reaction, Severe, GI bleeding , 3/19/18)


     ketorolac (Verified  Adverse Reaction, Severe, GI bleeding , 3/19/18)


     naproxen (Verified  Adverse Reaction, Severe, GI bleeding , 3/19/18)


     oxaprozin (Verified  Adverse Reaction, Severe, GI bleeding , 3/19/18)


Reported Meds & Prescriptions





Reported Meds & Active Scripts


Active


Oxycodone (Oxycodone HCl) 5 Mg Tab 5 Mg PO Q6HR PRN


Reported


Methocarbamol 500 Mg Tab 750 Mg PO TID


Vitamin D3 (Cholecalciferol (Vitamin D3)) 2,000 Unit Tab.chew 1,000 Units PO 

DAILY


Tramadol (Tramadol HCl) 50 Mg Tab 50 Mg PO Q8H PRN


Gabapentin 600 Mg Tab 600 Mg PO TID








Review of Systems


Except as stated in HPI:  all other systems reviewed are Neg





Physical Exam


Narrative


GENERAL: Well-developed well-nourished male no acute distress or respiratory 

distress


SKIN: Warm and dry.


HEAD: Normocephalic.


EYES: No scleral icterus. No injection or drainage. 


NECK: Supple, trachea midline. No JVD or lymphadenopathy.


CARDIOVASCULAR: Regular rate and rhythm without murmurs, gallops, or rubs. 


RESPIRATORY: Breath sounds equal bilaterally. No accessory muscle use.


GASTROINTESTINAL: Abdomen soft, non-tender, nondistended. 


MUSCULOSKELETAL: No cyanosis, or edema.  Attention right lower extremity mild 

erythema mild warmth swelling of the lower leg and foot capillary refill brisk 

and less than 2 seconds no pallor or coolness of the limb dorsalis pedis pulses 

2+ to palpation.  Attention left lower extremity left foot purplish ecchymotic 

discoloration of the left second toe with soft tissue swelling no drainage 

tender to palpation no deformity.  Dorsalis pedis pulse 2+ to palpation 

proximal lower extremity exam is grossly within normal limits except for some 

mild varicosities.


BACK: Nontender without obvious deformity. No CVA tenderness.








Data


Data


Last Documented VS





Vital Signs








  Date Time  Temp Pulse Resp B/P (MAP) Pulse Ox O2 Delivery O2 Flow Rate FiO2


 


3/20/18 02:00  86 16 146/96 (113) 97 Room Air  


 


3/19/18 21:52 98.8       








Orders





 Orders


Foot, Complete ([id number]) (3/19/18 )


^ Saline Lock (3/19/18 22:04)


Act Partial Throm Time (Ptt) (3/19/18 22:04)


Prothrombin Time / Inr (Pt) (3/19/18 22:04)


Complete Blood Count With Diff (3/19/18 22:04)


Basic Metabolic Panel (Bmp) (3/20/18 00:41)


Lactic Acid (3/20/18 00:41)


Blood Culture (3/20/18 00:41)


Aztreonam Inj (Azactam Inj) (3/20/18 00:45)


Metronidazole 500 Mg Inj (Flagyl 500 Mg (3/20/18 00:45)


Vancomycin Inj (Vancomycin Inj) (3/20/18 00:45)


Sodium Chlor 0.9% 1000 Ml Inj (Ns 1000 M (3/20/18 00:45)





Labs





Laboratory Tests








Test


  3/19/18


22:30 3/20/18


01:30 3/20/18


01:35


 


White Blood Count 8.3 TH/MM3   


 


Red Blood Count 5.38 MIL/MM3   


 


Hemoglobin 13.6 GM/DL   


 


Hematocrit 41.8 %   


 


Mean Corpuscular Volume 77.7 FL   


 


Mean Corpuscular Hemoglobin 25.4 PG   


 


Mean Corpuscular Hemoglobin


Concent 32.6 % 


  


  


 


 


Red Cell Distribution Width 17.7 %   


 


Platelet Count 278 TH/MM3   


 


Mean Platelet Volume 7.9 FL   


 


Neutrophils (%) (Auto) 60.1 %   


 


Lymphocytes (%) (Auto) 21.5 %   


 


Monocytes (%) (Auto) 9.5 %   


 


Eosinophils (%) (Auto) 4.1 %   


 


Basophils (%) (Auto) 4.8 %   


 


Neutrophils # (Auto) 5.0 TH/MM3   


 


Lymphocytes # (Auto) 1.8 TH/MM3   


 


Monocytes # (Auto) 0.8 TH/MM3   


 


Eosinophils # (Auto) 0.3 TH/MM3   


 


Basophils # (Auto) 0.4 TH/MM3   


 


CBC Comment DIFF FINAL   


 


Differential Comment    


 


Prothrombin Time 11.5 SEC   


 


Prothromb Time International


Ratio 1.1 RATIO 


  


  


 


 


Activated Partial


Thromboplast Time 34.8 SEC 


  


  


 


 


Blood Urea Nitrogen  4 MG/DL  


 


Creatinine  0.70 MG/DL  


 


Random Glucose  94 MG/DL  


 


Calcium Level  10.0 MG/DL  


 


Sodium Level  137 MEQ/L  


 


Potassium Level  4.1 MEQ/L  


 


Chloride Level  106 MEQ/L  


 


Carbon Dioxide Level  24.6 MEQ/L  


 


Anion Gap  6 MEQ/L  


 


Estimat Glomerular Filtration


Rate 


  117 ML/MIN 


  


 


 


Lactic Acid Level   0.9 mmol/L 











MDM


Medical Decision Making


Medical Screen Exam Complete:  Yes


Emergency Medical Condition:  Yes


Medical Record Reviewed:  Yes


Interpretation(s)


US leg: negative dvt


Last Impressions








Foot X-Ray 3/19/18 0000 Signed





Impressions: 





 Service Date/Time:  Monday, March 19, 2018 22:14 - CONCLUSION:  No acute bony 





 abnormality.     Jadiel Alvarado MD 





CBC & BMP Diagram


3/19/18 22:30








3/20/18 01:30








Calcium Level 10.0








Vital Signs








  Date Time  Temp Pulse Resp B/P (MAP) Pulse Ox O2 Delivery O2 Flow Rate FiO2


 


3/20/18 02:00  86 16 146/96 (113) 97 Room Air  


 


3/20/18 00:50  76 16 114/82 (93) 98 Room Air  


 


3/19/18 23:47  72 16 109/76 (87) 97 Room Air  


 


3/19/18 22:30  75 16 144/80 (101) 97 Room Air  


 


3/19/18 22:01   18     


 


3/19/18 21:52 98.8 79 18 122/72 (89) 97   


 


3/19/18 20:15 98.8 79 18 122/72 (89) 97   








Vital Signs








  Date Time  Temp Pulse Resp B/P (MAP) Pulse Ox O2 Delivery O2 Flow Rate FiO2


 


3/20/18 02:00  86 16 146/96 (113) 97 Room Air  


 


3/20/18 00:50  76 16 114/82 (93) 98 Room Air  


 


3/19/18 23:47  72 16 109/76 (87) 97 Room Air  


 


3/19/18 22:30  75 16 144/80 (101) 97 Room Air  


 


3/19/18 22:01   18     


 


3/19/18 21:52 98.8 79 18 122/72 (89) 97   


 


3/19/18 20:15 98.8 79 18 122/72 (89) 97   





lactic acid: 0.9, not elevated


Differential Diagnosis


DVT, cellulitis, fracture


Narrative Course


Ultrasound ordered for the right lower extremity to evaluate for DVT imaging of 

the left foot ordered for fracture IV access obtained





Diagnosis





 Primary Impression:  


 Cellulitis of right lower extremity


 Additional Impression:  


 Contusion of toe, left


Referrals:  


[facility]


1 day


Patient Instructions:  General Instructions





***Additional Instructions:  


Complete course of antibiotic as prescribed


Follow-up with your primary care provider


Return to the emergency department for any concerns or change in condition


Monitor temperature for fever take acetaminophen/Tylenol every 4 hours as 

needed for fever 100.4F or greater


Elevate right lower extremity


***Med/Other Pt SpecificInfo:  Prescription(s) given


Scripts


Doxycycline Hyclate (Doxycycline Hyclate) 100 Mg Cap


100 MG PO BID for Infection, #20 CAP 0 Refills


   Prov: Rosa Kendall MD         3/20/18


Disposition:  01 DISCHARGE HOME


Condition:  Stable











Rosa Kendall MD Mar 19, 2018 22:11

## 2018-03-19 NOTE — RADRPT
EXAM DATE/TIME:  03/19/2018 22:14 

 

[facility] COMPARISON:     

No previous studies available for comparison.

 

                     

INDICATIONS :     

Left foot, 2nd toe pain after kicking a bed post.

                     

 

MEDICAL HISTORY :     

None.          

 

SURGICAL HISTORY :     

None.   

 

ENCOUNTER:     

Initial                                        

 

ACUITY:     

1 day      

 

PAIN SCORE:     

5/10

 

LOCATION:     

Left  foot, 2nd toe

 

FINDINGS:     

Three view examination of the left foot demonstrates no soft tissue swelling, dislocation, or fractur
e.   The tarsal bones appear intact.  The interphalangeal and metatarsophalangeal joints are intact. 
 The calcaneus is intact.  Bony mineralization is normal.

 

CONCLUSION:     

No acute bony abnormality. 

 

 

 Jadiel Alvarado MD on March 19, 2018 at 22:43           

Board Certified Radiologist.

 This report was verified electronically.

## 2018-03-20 VITALS — SYSTOLIC BLOOD PRESSURE: 162 MMHG | DIASTOLIC BLOOD PRESSURE: 98 MMHG

## 2018-03-20 VITALS
RESPIRATION RATE: 16 BRPM | HEART RATE: 76 BPM | SYSTOLIC BLOOD PRESSURE: 114 MMHG | DIASTOLIC BLOOD PRESSURE: 82 MMHG | OXYGEN SATURATION: 98 %

## 2018-03-20 VITALS
RESPIRATION RATE: 16 BRPM | DIASTOLIC BLOOD PRESSURE: 94 MMHG | HEART RATE: 72 BPM | OXYGEN SATURATION: 96 % | SYSTOLIC BLOOD PRESSURE: 145 MMHG

## 2018-03-20 VITALS
DIASTOLIC BLOOD PRESSURE: 96 MMHG | HEART RATE: 86 BPM | SYSTOLIC BLOOD PRESSURE: 146 MMHG | OXYGEN SATURATION: 97 % | RESPIRATION RATE: 16 BRPM

## 2018-03-20 LAB
BUN SERPL-MCNC: 4 MG/DL (ref 7–18)
CALCIUM SERPL-MCNC: 10 MG/DL (ref 8.5–10.1)
CHLORIDE SERPL-SCNC: 106 MEQ/L (ref 98–107)
CREAT SERPL-MCNC: 0.7 MG/DL (ref 0.6–1.3)
GFR SERPLBLD BASED ON 1.73 SQ M-ARVRAT: 117 ML/MIN (ref 89–?)
GLUCOSE SERPL-MCNC: 94 MG/DL (ref 74–106)
HCO3 BLD-SCNC: 24.6 MEQ/L (ref 21–32)
SODIUM SERPL-SCNC: 137 MEQ/L (ref 136–145)